# Patient Record
Sex: FEMALE | Race: BLACK OR AFRICAN AMERICAN | NOT HISPANIC OR LATINO | Employment: OTHER | RURAL
[De-identification: names, ages, dates, MRNs, and addresses within clinical notes are randomized per-mention and may not be internally consistent; named-entity substitution may affect disease eponyms.]

---

## 2022-01-21 ENCOUNTER — HOSPITAL ENCOUNTER (OUTPATIENT)
Dept: RADIOLOGY | Facility: HOSPITAL | Age: 72
Discharge: HOME | End: 2022-01-21
Attending: INTERNAL MEDICINE
Payer: MEDICARE

## 2022-01-21 VITALS — BODY MASS INDEX: 31.02 KG/M2 | HEIGHT: 66 IN | WEIGHT: 193 LBS

## 2022-01-21 DIAGNOSIS — Z12.31 VISIT FOR SCREENING MAMMOGRAM: ICD-10-CM

## 2022-01-21 PROCEDURE — 77067 SCR MAMMO BI INCL CAD: CPT | Mod: TC

## 2022-05-19 ENCOUNTER — TELEPHONE (OUTPATIENT)
Dept: GASTROENTEROLOGY | Facility: CLINIC | Age: 72
End: 2022-05-19
Payer: MEDICARE

## 2022-05-19 DIAGNOSIS — Z12.11 ENCOUNTER FOR SCREENING COLONOSCOPY: Primary | ICD-10-CM

## 2024-01-19 ENCOUNTER — HOSPITAL ENCOUNTER (OUTPATIENT)
Dept: RADIOLOGY | Facility: HOSPITAL | Age: 74
Discharge: HOME | End: 2024-01-19
Attending: INTERNAL MEDICINE
Payer: MEDICARE

## 2024-01-19 DIAGNOSIS — Z13.820 SCREENING FOR OSTEOPOROSIS: ICD-10-CM

## 2024-01-19 PROCEDURE — 77080 DXA BONE DENSITY AXIAL: CPT | Mod: TC

## 2024-01-19 PROCEDURE — 77080 DXA BONE DENSITY AXIAL: CPT | Mod: 26,,, | Performed by: RADIOLOGY

## 2024-07-03 ENCOUNTER — HOSPITAL ENCOUNTER (OUTPATIENT)
Dept: RADIOLOGY | Facility: HOSPITAL | Age: 74
Discharge: HOME | End: 2024-07-03
Attending: NURSE PRACTITIONER
Payer: MEDICARE

## 2024-07-03 VITALS — BODY MASS INDEX: 36.44 KG/M2 | HEIGHT: 62 IN | WEIGHT: 198 LBS

## 2024-07-03 DIAGNOSIS — Z12.39 BREAST CANCER SCREENING: ICD-10-CM

## 2024-07-03 PROCEDURE — 77067 SCR MAMMO BI INCL CAD: CPT | Mod: TC

## 2024-07-26 ENCOUNTER — HOSPITAL ENCOUNTER (OUTPATIENT)
Dept: RADIOLOGY | Facility: HOSPITAL | Age: 74
Discharge: HOME | End: 2024-07-26
Attending: NURSE PRACTITIONER
Payer: MEDICARE

## 2024-07-26 DIAGNOSIS — M54.16 LUMBAR RADICULOPATHY: ICD-10-CM

## 2024-07-26 PROCEDURE — 72131 CT LUMBAR SPINE W/O DYE: CPT | Mod: TC

## 2024-07-26 PROCEDURE — 72131 CT LUMBAR SPINE W/O DYE: CPT | Mod: 26,,, | Performed by: RADIOLOGY

## 2024-09-20 DIAGNOSIS — M54.16 LUMBAR RADICULOPATHY: Primary | ICD-10-CM

## 2024-10-16 ENCOUNTER — OFFICE VISIT (OUTPATIENT)
Dept: PAIN MEDICINE | Facility: CLINIC | Age: 74
End: 2024-10-16
Payer: MEDICARE

## 2024-10-16 VITALS
BODY MASS INDEX: 35.15 KG/M2 | DIASTOLIC BLOOD PRESSURE: 90 MMHG | WEIGHT: 191 LBS | RESPIRATION RATE: 18 BRPM | HEART RATE: 71 BPM | HEIGHT: 62 IN | SYSTOLIC BLOOD PRESSURE: 144 MMHG

## 2024-10-16 DIAGNOSIS — M54.16 LUMBAR RADICULOPATHY: Chronic | ICD-10-CM

## 2024-10-16 DIAGNOSIS — Z79.899 ENCOUNTER FOR LONG-TERM (CURRENT) USE OF MEDICATIONS: Primary | ICD-10-CM

## 2024-10-16 DIAGNOSIS — M54.50 CHRONIC MIDLINE LOW BACK PAIN, UNSPECIFIED WHETHER SCIATICA PRESENT: Chronic | ICD-10-CM

## 2024-10-16 DIAGNOSIS — M46.1 BILATERAL SACROILIITIS: Chronic | ICD-10-CM

## 2024-10-16 DIAGNOSIS — G89.29 CHRONIC MIDLINE LOW BACK PAIN, UNSPECIFIED WHETHER SCIATICA PRESENT: Chronic | ICD-10-CM

## 2024-10-16 LAB

## 2024-10-16 PROCEDURE — 3077F SYST BP >= 140 MM HG: CPT | Mod: ,,, | Performed by: PAIN MEDICINE

## 2024-10-16 PROCEDURE — 99203 OFFICE O/P NEW LOW 30 MIN: CPT | Mod: S$PBB,,, | Performed by: PAIN MEDICINE

## 2024-10-16 PROCEDURE — 99215 OFFICE O/P EST HI 40 MIN: CPT | Mod: PBBFAC | Performed by: PAIN MEDICINE

## 2024-10-16 PROCEDURE — 3008F BODY MASS INDEX DOCD: CPT | Mod: ,,, | Performed by: PAIN MEDICINE

## 2024-10-16 PROCEDURE — 1101F PT FALLS ASSESS-DOCD LE1/YR: CPT | Mod: ,,, | Performed by: PAIN MEDICINE

## 2024-10-16 PROCEDURE — 3080F DIAST BP >= 90 MM HG: CPT | Mod: ,,, | Performed by: PAIN MEDICINE

## 2024-10-16 PROCEDURE — 99999 PR PBB SHADOW E&M-EST. PATIENT-LVL V: CPT | Mod: PBBFAC,,, | Performed by: PAIN MEDICINE

## 2024-10-16 PROCEDURE — 99999PBSHW POCT URINE DRUG SCREEN PRESUMP: Mod: PBBFAC,,,

## 2024-10-16 PROCEDURE — 1125F AMNT PAIN NOTED PAIN PRSNT: CPT | Mod: ,,, | Performed by: PAIN MEDICINE

## 2024-10-16 PROCEDURE — 1159F MED LIST DOCD IN RCRD: CPT | Mod: ,,, | Performed by: PAIN MEDICINE

## 2024-10-16 PROCEDURE — 80305 DRUG TEST PRSMV DIR OPT OBS: CPT | Mod: PBBFAC | Performed by: PAIN MEDICINE

## 2024-10-16 PROCEDURE — 3288F FALL RISK ASSESSMENT DOCD: CPT | Mod: ,,, | Performed by: PAIN MEDICINE

## 2024-10-16 RX ORDER — HYDROCHLOROTHIAZIDE 25 MG/1
25 TABLET ORAL DAILY
COMMUNITY

## 2024-10-16 RX ORDER — LANOLIN ALCOHOL/MO/W.PET/CERES
1 CREAM (GRAM) TOPICAL DAILY
COMMUNITY

## 2024-10-16 RX ORDER — AMLODIPINE BESYLATE 10 MG/1
10 TABLET ORAL DAILY
COMMUNITY
Start: 2024-08-22

## 2024-10-16 NOTE — H&P (VIEW-ONLY)
"Chronic Pain - New Consult    Referring Physician: Roseanna Kimball NP       SUBJECTIVE: Disclaimer: This note has been generated using voice-recognition software. There may be typographical errors that have been missed during proof-reading      Initial encounter:    Regina Murray presents to the clinic for the evaluation of lowerback  pain.       74-year-old female presents for new patient evaluation and consultation from Roseanna Kimball NP.  Patient has a long history of lower back pain,  status post laminectomy greater than 12 years ago.  She did well until the past year and  the pain has progressively worsened.  The pain is primarily axial lower back without leg involvement.  The pain is exacerbated with lumbar extension and prolonged standing.  She notes some relief with flexion.  She denies paresthesia or weakness of the lower extremities.  CT of the lumbar spine from July 26, 2024 revealed multilevel facet degenerative changes, diffuse disc bulges  and status post laminectomy at L4-5.  She has been treated conservatively with tramadol.      Pain Assessment  Pain Assessment: 0-10  Pain Score:   9  Pain Location: Back  Pain Descriptors: Throbbing  Pain Frequency: Intermittent  Pain Onset: Awakened from sleep  Aggravating Factors: Standing, Bending, Walking  Pain Intervention(s): Home medication, Medication (See eMAR)      Physical Therapy/Home Exercise: no,      Pain Medications:  has a current medication list which includes the following prescription(s): amlodipine, ferrous sulfate, and hydrochlorothiazide.      Tried in Past:  NSAIDS-yes  TCA-no  SNRI-no  Anti-convulsants-no  Muscle Relaxants-no  Opioids-yes  Benzodiazepines-no     4A"s of Opioid Risk Assessment  Activity: Patient has difficulty performing  ADL  Analgesia:  Patient's pain is partially controlled by current medication.   Aberrant Behavior:  reviewed with no aberrant drug seeking/taking behavior     report:  Reviewed and consistent with " medication use as prescribed.    Patient denies suicidal or homicidal ideations    Pain interventional therapy-no    Chiropractor -no  Acupuncture - no  TENS unit -no  Massage therapy-no  Spinal decompression -no  Joint replacement -yes       Review of Systems   Constitutional: Negative.    HENT: Negative.     Eyes: Negative.    Respiratory: Negative.     Cardiovascular: Negative.    Gastrointestinal: Negative.    Endocrine: Negative.    Genitourinary: Negative.    Musculoskeletal:  Positive for arthralgias and back pain.   Integumentary:  Negative.   Neurological: Negative.    Hematological: Negative.    Psychiatric/Behavioral: Negative.               CT Lumbar Spine Without Contrast  Narrative: EXAMINATION:  CT LUMBAR SPINE WITHOUT CONTRAST    CLINICAL HISTORY:  Radiculopathy, lumbar region    TECHNIQUE:  Low-dose axial, sagittal and coronal reformations are obtained through the lumbar spine.  Contrast was not administered.  The CT examination was performed using one or more of the following dose reduction techniques: Automated exposure control, adjustment of the mA and kV according to patient's size, use of acute or iterative reconstruction techniques.    COMPARISON:  None.    FINDINGS:  Prominent degenerative changes are seen at T11-12 partially assessed with osteophyte formation and disc height loss and vacuum disc phenomena.  There is disc bulging with bilateral foraminal stenosis and mild spinal canal stenosis at this level.  The vertebral body heights of the lumbar spine are maintained.  There is grade 1 anterolisthesis of L4 on L5.  At the L4-5 level there has been posterior fusion changes.  This hardware appears in the expected location.  No acute fracture identified.  Degenerative endplate and facet changes are seen throughout the lower thoracic and lumbar spine.    L1-2: Disc bulge and facet degeneration.  There appears to be mild spinal canal and bilateral foraminal stenosis.    L2-3: Diffuse disc  "bulging and facet degeneration.  Mild to moderate spinal canal stenosis and bilateral foraminal stenosis.    L3-4: Disc bulging and facet degeneration.  At least mild spinal canal stenosis and mild-to-moderate bilateral foraminal stenosis.    L4-5: There is been partial laminectomy changes with what appear to be some osseous overgrowth.  No spinal canal stenosis appreciated.  Osteophytes with mild bilateral foraminal stenosis.    L5-S1: Disc bulge and facet degeneration.  Mild spinal canal and moderate bilateral foraminal stenosis.  Impression: Postoperative changes of L4-5.  Multilevel degenerative changes throughout the lower thoracic and lumbar spine.    Electronically signed by: Tim Terry  Date:    07/26/2024  Time:    16:32         History reviewed. No pertinent past medical history.  Past Surgical History:   Procedure Laterality Date    HYSTERECTOMY      KNEE SURGERY Left      Social History     Socioeconomic History    Marital status:    Tobacco Use    Smoking status: Never    Smokeless tobacco: Never     No family history on file.  Review of patient's allergies indicates:   Allergen Reactions    Penicillins Itching         OBJECTIVE:  Vitals:    10/16/24 1425   BP: (!) 144/90   Pulse: 71   Resp: 18     BP (!) 144/90 (BP Location: Right arm, Patient Position: Sitting)   Pulse 71   Resp 18   Ht 5' 2" (1.575 m)   Wt 86.6 kg (191 lb)   BMI 34.93 kg/m²   Physical Exam  Vitals and nursing note reviewed.   Constitutional:       General: She is not in acute distress.     Appearance: Normal appearance. She is not ill-appearing, toxic-appearing or diaphoretic.   HENT:      Head: Normocephalic and atraumatic.      Nose: Nose normal.      Mouth/Throat:      Mouth: Mucous membranes are moist.   Eyes:      Extraocular Movements: Extraocular movements intact.      Pupils: Pupils are equal, round, and reactive to light.   Cardiovascular:      Rate and Rhythm: Normal rate and regular rhythm.      Heart sounds: " Normal heart sounds.   Pulmonary:      Effort: Pulmonary effort is normal. No respiratory distress.      Breath sounds: Normal breath sounds. No stridor. No wheezing or rhonchi.   Abdominal:      General: Bowel sounds are normal.      Palpations: Abdomen is soft.   Musculoskeletal:         General: No swelling or deformity.      Cervical back: Normal and normal range of motion. No spasms or tenderness. No pain with movement. Normal range of motion.      Thoracic back: Normal.      Lumbar back: Tenderness and bony tenderness present. No spasms. Decreased range of motion. Negative right straight leg raise test and negative left straight leg raise test. No scoliosis.      Right lower leg: No edema.      Left lower leg: No edema.      Comments: Lumbar pain with flexion, extension and lateral rotation  SI Joint Exam:     Palpation of the bilateral  SI joint is painful.  CHERRIE/ Timi's test is positive bilaterally  Gaenslen/Yoeman's test is positive bilaterally  Thigh thrust test is positive bilaterally     Skin:     General: Skin is warm.   Neurological:      General: No focal deficit present.      Mental Status: She is alert and oriented to person, place, and time. Mental status is at baseline.      Cranial Nerves: No cranial nerve deficit.      Sensory: Sensation is intact. No sensory deficit.      Motor: No weakness.      Coordination: Coordination normal.      Gait: Gait abnormal.      Deep Tendon Reflexes:      Reflex Scores:       Patellar reflexes are 1+ on the right side and 1+ on the left side.       Achilles reflexes are 1+ on the right side and 1+ on the left side.     Comments: Mild leg length discrepancy with right leg slightly longer than left   Psychiatric:         Mood and Affect: Mood normal.         Behavior: Behavior normal.          ASSESSMENT: 74 y.o. year old female with pain, consistent with     Encounter Diagnoses   Name Primary?    Lumbar radiculopathy     Encounter for long-term (current) use  of medications Yes    Bilateral sacroiliitis     Chronic midline low back pain, unspecified whether sciatica present         PLAN:   1. reviewed  2..Addiction, Dependency, Tolerance, Opioid abuse-misuse, Death, Diversion Discussed. Overdose reversal drug Naloxone discussed  3. Opioid contract signed today  4.Refill/ Continue medications for pain control and function       Requested Prescriptions      No prescriptions requested or ordered in this encounter     5. Urine drug screen and confirmation testing was ordered as documented on the requisition form in order to monitor for compliance with prescribed opiates and to ensure that there was no misuse/abuse of other non-prescribed opiates or illicit drugs.  I will determine if the patient is suitable for continuation of opioid therapy after obtaining  the definitive urine drug screen for confirmation.  6. Start physical therapy 2 to 3 times week x6 weeks for lower back pain and sacroiliitis  7. Schedule bilateral SI joint injections under fluoroscopy for bilateral sacroiliitis  Orders Placed This Encounter   Procedures    Controlled Substance Monitoring Panel, Random, Urine     Standing Status:   Future     Number of Occurrences:   1     Standing Expiration Date:   12/15/2025    Ambulatory referral/consult to Physical/Occupational Therapy     Standing Status:   Future     Standing Expiration Date:   11/16/2025     Referral Priority:   Routine     Referral Type:   Physical Medicine     Referral Reason:   Specialty Services Required     Requested Specialty:   Physical Therapy     Number of Visits Requested:   1    POCT Urine Drug Screen ([facility])     Interpretive Information:     Negative:  No drug detected at the cut off level.   Positive:  This result represents presumptive positive for the   tested drug, other substances may yield a positive response other   than the analyte of interest. This result should be utilized for   diagnostic purpose only.  Confirmation testing will be performed upon physician request only.       Case Request Operating Room: BLOCK, SACROILIAC JOINT     Order Specific Question:   Medical Necessity:     Answer:   Medically Non-Urgent [100]     Order Specific Question:   Case classification     Answer:   E - Elective [90]     Order Specific Question:   Positioning:     Answer:   Prone [1003]     Order Specific Question:   Post-Procedure Disposition:     Answer:   PACU [1]     Order Specific Question:   Estimated Length of Stay:     Answer:   0 midnight     Order Specific Question:   Is an on-site pathologist required for this procedure?     Answer:   N/A      8.Indications for this procedure for this specific patient include the following     - Pain is in upper level of iliac crest and gluteal fold ( Spine and pelvis) for greater than 3 months  - Repeat injections are done with 75% relief from diagnostic injections and not done more frequently than every 2 months with a max of 6 in a year  - Patient  failed conservative therapies and injection is being provided as part of a comprehensive pain program  - Exam is negative of any neurological findings or radiculopathy and consistent with SI joint pain  -Patient has nonradicular pain and greatest below the L5 vertebrae, localized over the posterior SI joint  - Exam positive for compression test (Marc's test) , Timi's test, Gaenslen and compression thrust test  -Pain severity causes functional deficits and patient is unable to perform ADLs despite conservative treatments  - Patient  is participating in an active ongoing rehabilitation program, ongoing home exercise program, or functional restoration program including ice, heat and rest  - For positive diagnostic or therapeutic blocks to provide greater than 50% consistent pain relief in an individual's level of back pain, increased function and decrease use of pain meds     9.Monitored Anesthesia Care medical necessity authorization  request:    Monitor anesthesia request is medically indicated for the scheduled nerve block procedure due to:  - needle phobia and anxiety, placing  the patient at risk during the provided service.  -patient has an ASA class greater than 3 and requires constant presence of an anesthesiologist during the procedure:  -patient has severe problems with muscles and muscle spasticity that makes it hard to lie still  -patient suffers from chronic pain and is unable to function due to  diminished ADLs    10.The planned medically necessary  surgical procedure is performed in a hospital outpatient department and not in an ambulatory surgical center due to:     -there is no geographically assessable ambulatory surgery center that has the  necessary equipment and fluoroscopy needed for the procedure     -there is no geographically assessable ambulatory surgical center available at which the physician has privileges     -an ASC's  specific  guideline regarding the individuals weight or health conditions that prevent the use of an ASC       -injections must be performed under fluoroscopy image guidance with contrast unless the patient has a documented contrast allergy or pregnancy       The total time spent for evaluation and management on 10/16/2024 including reviewing separately obtained history, performing a medically appropriate exam and evaluation, documenting clinical information in the health record, independently interpreting results and communicating them to the patient/family/caregiver, and ordering medications/tests/procedures was between 15-29 minutes.    The above plan and management options were discussed at length with patient. Patient is in agreement with the above and verbalized understanding. It will be communicated with the referring physician via electronic record, fax, or mail.    Leela Ruth  10/16/2024

## 2024-10-16 NOTE — PROGRESS NOTES
"Chronic Pain - New Consult    Referring Physician: Roseanna Kimball NP       SUBJECTIVE: Disclaimer: This note has been generated using voice-recognition software. There may be typographical errors that have been missed during proof-reading      Initial encounter:    Regina Murray presents to the clinic for the evaluation of lowerback  pain.       74-year-old female presents for new patient evaluation and consultation from Roseanna Kimball NP.  Patient has a long history of lower back pain,  status post laminectomy greater than 12 years ago.  She did well until the past year and  the pain has progressively worsened.  The pain is primarily axial lower back without leg involvement.  The pain is exacerbated with lumbar extension and prolonged standing.  She notes some relief with flexion.  She denies paresthesia or weakness of the lower extremities.  CT of the lumbar spine from July 26, 2024 revealed multilevel facet degenerative changes, diffuse disc bulges  and status post laminectomy at L4-5.  She has been treated conservatively with tramadol.      Pain Assessment  Pain Assessment: 0-10  Pain Score:   9  Pain Location: Back  Pain Descriptors: Throbbing  Pain Frequency: Intermittent  Pain Onset: Awakened from sleep  Aggravating Factors: Standing, Bending, Walking  Pain Intervention(s): Home medication, Medication (See eMAR)      Physical Therapy/Home Exercise: no,      Pain Medications:  has a current medication list which includes the following prescription(s): amlodipine, ferrous sulfate, and hydrochlorothiazide.      Tried in Past:  NSAIDS-yes  TCA-no  SNRI-no  Anti-convulsants-no  Muscle Relaxants-no  Opioids-yes  Benzodiazepines-no     4A"s of Opioid Risk Assessment  Activity: Patient has difficulty performing  ADL  Analgesia:  Patient's pain is partially controlled by current medication.   Aberrant Behavior:  reviewed with no aberrant drug seeking/taking behavior     report:  Reviewed and consistent with " medication use as prescribed.    Patient denies suicidal or homicidal ideations    Pain interventional therapy-no    Chiropractor -no  Acupuncture - no  TENS unit -no  Massage therapy-no  Spinal decompression -no  Joint replacement -yes       Review of Systems   Constitutional: Negative.    HENT: Negative.     Eyes: Negative.    Respiratory: Negative.     Cardiovascular: Negative.    Gastrointestinal: Negative.    Endocrine: Negative.    Genitourinary: Negative.    Musculoskeletal:  Positive for arthralgias and back pain.   Integumentary:  Negative.   Neurological: Negative.    Hematological: Negative.    Psychiatric/Behavioral: Negative.               CT Lumbar Spine Without Contrast  Narrative: EXAMINATION:  CT LUMBAR SPINE WITHOUT CONTRAST    CLINICAL HISTORY:  Radiculopathy, lumbar region    TECHNIQUE:  Low-dose axial, sagittal and coronal reformations are obtained through the lumbar spine.  Contrast was not administered.  The CT examination was performed using one or more of the following dose reduction techniques: Automated exposure control, adjustment of the mA and kV according to patient's size, use of acute or iterative reconstruction techniques.    COMPARISON:  None.    FINDINGS:  Prominent degenerative changes are seen at T11-12 partially assessed with osteophyte formation and disc height loss and vacuum disc phenomena.  There is disc bulging with bilateral foraminal stenosis and mild spinal canal stenosis at this level.  The vertebral body heights of the lumbar spine are maintained.  There is grade 1 anterolisthesis of L4 on L5.  At the L4-5 level there has been posterior fusion changes.  This hardware appears in the expected location.  No acute fracture identified.  Degenerative endplate and facet changes are seen throughout the lower thoracic and lumbar spine.    L1-2: Disc bulge and facet degeneration.  There appears to be mild spinal canal and bilateral foraminal stenosis.    L2-3: Diffuse disc  "bulging and facet degeneration.  Mild to moderate spinal canal stenosis and bilateral foraminal stenosis.    L3-4: Disc bulging and facet degeneration.  At least mild spinal canal stenosis and mild-to-moderate bilateral foraminal stenosis.    L4-5: There is been partial laminectomy changes with what appear to be some osseous overgrowth.  No spinal canal stenosis appreciated.  Osteophytes with mild bilateral foraminal stenosis.    L5-S1: Disc bulge and facet degeneration.  Mild spinal canal and moderate bilateral foraminal stenosis.  Impression: Postoperative changes of L4-5.  Multilevel degenerative changes throughout the lower thoracic and lumbar spine.    Electronically signed by: Tim Terry  Date:    07/26/2024  Time:    16:32         History reviewed. No pertinent past medical history.  Past Surgical History:   Procedure Laterality Date    HYSTERECTOMY      KNEE SURGERY Left      Social History     Socioeconomic History    Marital status:    Tobacco Use    Smoking status: Never    Smokeless tobacco: Never     No family history on file.  Review of patient's allergies indicates:   Allergen Reactions    Penicillins Itching         OBJECTIVE:  Vitals:    10/16/24 1425   BP: (!) 144/90   Pulse: 71   Resp: 18     BP (!) 144/90 (BP Location: Right arm, Patient Position: Sitting)   Pulse 71   Resp 18   Ht 5' 2" (1.575 m)   Wt 86.6 kg (191 lb)   BMI 34.93 kg/m²   Physical Exam  Vitals and nursing note reviewed.   Constitutional:       General: She is not in acute distress.     Appearance: Normal appearance. She is not ill-appearing, toxic-appearing or diaphoretic.   HENT:      Head: Normocephalic and atraumatic.      Nose: Nose normal.      Mouth/Throat:      Mouth: Mucous membranes are moist.   Eyes:      Extraocular Movements: Extraocular movements intact.      Pupils: Pupils are equal, round, and reactive to light.   Cardiovascular:      Rate and Rhythm: Normal rate and regular rhythm.      Heart sounds: " Normal heart sounds.   Pulmonary:      Effort: Pulmonary effort is normal. No respiratory distress.      Breath sounds: Normal breath sounds. No stridor. No wheezing or rhonchi.   Abdominal:      General: Bowel sounds are normal.      Palpations: Abdomen is soft.   Musculoskeletal:         General: No swelling or deformity.      Cervical back: Normal and normal range of motion. No spasms or tenderness. No pain with movement. Normal range of motion.      Thoracic back: Normal.      Lumbar back: Tenderness and bony tenderness present. No spasms. Decreased range of motion. Negative right straight leg raise test and negative left straight leg raise test. No scoliosis.      Right lower leg: No edema.      Left lower leg: No edema.      Comments: Lumbar pain with flexion, extension and lateral rotation  SI Joint Exam:     Palpation of the bilateral  SI joint is painful.  CHERRIE/ Timi's test is positive bilaterally  Gaenslen/Yoeman's test is positive bilaterally  Thigh thrust test is positive bilaterally     Skin:     General: Skin is warm.   Neurological:      General: No focal deficit present.      Mental Status: She is alert and oriented to person, place, and time. Mental status is at baseline.      Cranial Nerves: No cranial nerve deficit.      Sensory: Sensation is intact. No sensory deficit.      Motor: No weakness.      Coordination: Coordination normal.      Gait: Gait abnormal.      Deep Tendon Reflexes:      Reflex Scores:       Patellar reflexes are 1+ on the right side and 1+ on the left side.       Achilles reflexes are 1+ on the right side and 1+ on the left side.     Comments: Mild leg length discrepancy with right leg slightly longer than left   Psychiatric:         Mood and Affect: Mood normal.         Behavior: Behavior normal.          ASSESSMENT: 74 y.o. year old female with pain, consistent with     Encounter Diagnoses   Name Primary?    Lumbar radiculopathy     Encounter for long-term (current) use  of medications Yes    Bilateral sacroiliitis     Chronic midline low back pain, unspecified whether sciatica present         PLAN:   1. reviewed  2..Addiction, Dependency, Tolerance, Opioid abuse-misuse, Death, Diversion Discussed. Overdose reversal drug Naloxone discussed  3. Opioid contract signed today  4.Refill/ Continue medications for pain control and function       Requested Prescriptions      No prescriptions requested or ordered in this encounter     5. Urine drug screen and confirmation testing was ordered as documented on the requisition form in order to monitor for compliance with prescribed opiates and to ensure that there was no misuse/abuse of other non-prescribed opiates or illicit drugs.  I will determine if the patient is suitable for continuation of opioid therapy after obtaining  the definitive urine drug screen for confirmation.  6. Start physical therapy 2 to 3 times week x6 weeks for lower back pain and sacroiliitis  7. Schedule bilateral SI joint injections under fluoroscopy for bilateral sacroiliitis  Orders Placed This Encounter   Procedures    Controlled Substance Monitoring Panel, Random, Urine     Standing Status:   Future     Number of Occurrences:   1     Standing Expiration Date:   12/15/2025    Ambulatory referral/consult to Physical/Occupational Therapy     Standing Status:   Future     Standing Expiration Date:   11/16/2025     Referral Priority:   Routine     Referral Type:   Physical Medicine     Referral Reason:   Specialty Services Required     Requested Specialty:   Physical Therapy     Number of Visits Requested:   1    POCT Urine Drug Screen ([facility])     Interpretive Information:     Negative:  No drug detected at the cut off level.   Positive:  This result represents presumptive positive for the   tested drug, other substances may yield a positive response other   than the analyte of interest. This result should be utilized for   diagnostic purpose only.  Confirmation testing will be performed upon physician request only.       Case Request Operating Room: BLOCK, SACROILIAC JOINT     Order Specific Question:   Medical Necessity:     Answer:   Medically Non-Urgent [100]     Order Specific Question:   Case classification     Answer:   E - Elective [90]     Order Specific Question:   Positioning:     Answer:   Prone [1003]     Order Specific Question:   Post-Procedure Disposition:     Answer:   PACU [1]     Order Specific Question:   Estimated Length of Stay:     Answer:   0 midnight     Order Specific Question:   Is an on-site pathologist required for this procedure?     Answer:   N/A      8.Indications for this procedure for this specific patient include the following     - Pain is in upper level of iliac crest and gluteal fold ( Spine and pelvis) for greater than 3 months  - Repeat injections are done with 75% relief from diagnostic injections and not done more frequently than every 2 months with a max of 6 in a year  - Patient  failed conservative therapies and injection is being provided as part of a comprehensive pain program  - Exam is negative of any neurological findings or radiculopathy and consistent with SI joint pain  -Patient has nonradicular pain and greatest below the L5 vertebrae, localized over the posterior SI joint  - Exam positive for compression test (Marc's test) , Timi's test, Gaenslen and compression thrust test  -Pain severity causes functional deficits and patient is unable to perform ADLs despite conservative treatments  - Patient  is participating in an active ongoing rehabilitation program, ongoing home exercise program, or functional restoration program including ice, heat and rest  - For positive diagnostic or therapeutic blocks to provide greater than 50% consistent pain relief in an individual's level of back pain, increased function and decrease use of pain meds     9.Monitored Anesthesia Care medical necessity authorization  request:    Monitor anesthesia request is medically indicated for the scheduled nerve block procedure due to:  - needle phobia and anxiety, placing  the patient at risk during the provided service.  -patient has an ASA class greater than 3 and requires constant presence of an anesthesiologist during the procedure:  -patient has severe problems with muscles and muscle spasticity that makes it hard to lie still  -patient suffers from chronic pain and is unable to function due to  diminished ADLs    10.The planned medically necessary  surgical procedure is performed in a hospital outpatient department and not in an ambulatory surgical center due to:     -there is no geographically assessable ambulatory surgery center that has the  necessary equipment and fluoroscopy needed for the procedure     -there is no geographically assessable ambulatory surgical center available at which the physician has privileges     -an ASC's  specific  guideline regarding the individuals weight or health conditions that prevent the use of an ASC       -injections must be performed under fluoroscopy image guidance with contrast unless the patient has a documented contrast allergy or pregnancy       The total time spent for evaluation and management on 10/16/2024 including reviewing separately obtained history, performing a medically appropriate exam and evaluation, documenting clinical information in the health record, independently interpreting results and communicating them to the patient/family/caregiver, and ordering medications/tests/procedures was between 15-29 minutes.    The above plan and management options were discussed at length with patient. Patient is in agreement with the above and verbalized understanding. It will be communicated with the referring physician via electronic record, fax, or mail.    Leela Ruth  10/16/2024

## 2024-10-16 NOTE — PATIENT INSTRUCTIONS
YOU ARE SCHEDULED ON 11/12/24 AT 9:00 AM FOR YOUR PROCEDURE- BILATERAL SI JOINT INJECTION WITH  .        Procedure Instructions:    Nothing to eat or drink for 8 hours or after midnight including gum, candy, mints, or tobacco products.  If you are scheduled for 1:30 or later nothing to eat or drink after 5 a.m. the morning of the procedure, including gum, candy, mints, or tobacco products.  Must have a  at least 18 yrs of age to stay present at all times  No Diabetic medications the morning of procedure, check blood sugar the morning of procedure, if it is greater than 200 call the office at [phone]  If you are started on antibiotics or have been prescribed antibiotics, have a fever, or have any other type of infection call to reschedule procedure.  If you take blood pressure medications you can take it at your regular scheduled time with a small sip of WATER!  Dentures are to be removed prior to procedure or we can provide you with a denture cup to remove them prior to being taken back for procedure.   False eyelashes are to be removed before the morning of procedure.    Contacts will have to be removed prior to procedure.  No jewelry is to be worn to the procedure.     HOLD ASPIRIN AND ASPIRIN PRODUCTS  (ASPIRIN, BC POWDER ETC. ) FOR 7 DAYS  PRIOR TO PROCEDURE  HOLD NSAIDS( ibuprofen, mobic, meloxicam, advil, diclofenac, naproxen, relafen, celebrex,  methotrexate, aleve etc....)  FOR 3 DAYS   PRIOR TO PROCEDURE        SIGNATURE:_________________________________________________________________________________________________

## 2024-11-12 ENCOUNTER — HOSPITAL ENCOUNTER (OUTPATIENT)
Facility: HOSPITAL | Age: 74
Discharge: HOME | End: 2024-11-12
Attending: PAIN MEDICINE | Admitting: PAIN MEDICINE
Payer: MEDICARE

## 2024-11-12 ENCOUNTER — ANESTHESIA EVENT (OUTPATIENT)
Dept: PAIN MEDICINE | Facility: HOSPITAL | Age: 74
End: 2024-11-12
Payer: MEDICARE

## 2024-11-12 ENCOUNTER — ANESTHESIA (OUTPATIENT)
Dept: PAIN MEDICINE | Facility: HOSPITAL | Age: 74
End: 2024-11-12
Payer: MEDICARE

## 2024-11-12 VITALS
HEART RATE: 52 BPM | BODY MASS INDEX: 35.33 KG/M2 | DIASTOLIC BLOOD PRESSURE: 75 MMHG | WEIGHT: 192 LBS | RESPIRATION RATE: 13 BRPM | HEIGHT: 62 IN | TEMPERATURE: 98 F | SYSTOLIC BLOOD PRESSURE: 174 MMHG | OXYGEN SATURATION: 97 %

## 2024-11-12 DIAGNOSIS — M46.1 SACROILIITIS: ICD-10-CM

## 2024-11-12 PROCEDURE — 63600175 PHARM REV CODE 636 W HCPCS: Mod: JG | Performed by: PAIN MEDICINE

## 2024-11-12 PROCEDURE — 27096 INJECT SACROILIAC JOINT: CPT | Mod: 50 | Performed by: PAIN MEDICINE

## 2024-11-12 PROCEDURE — 37000008 HC ANESTHESIA 1ST 15 MINUTES: Performed by: PAIN MEDICINE

## 2024-11-12 PROCEDURE — D9220A PRA ANESTHESIA: Mod: ,,, | Performed by: NURSE ANESTHETIST, CERTIFIED REGISTERED

## 2024-11-12 PROCEDURE — 27096 INJECT SACROILIAC JOINT: CPT | Mod: 50,,, | Performed by: PAIN MEDICINE

## 2024-11-12 PROCEDURE — 63600175 PHARM REV CODE 636 W HCPCS: Performed by: NURSE ANESTHETIST, CERTIFIED REGISTERED

## 2024-11-12 RX ORDER — LIDOCAINE HYDROCHLORIDE 20 MG/ML
INJECTION, SOLUTION EPIDURAL; INFILTRATION; INTRACAUDAL; PERINEURAL
Status: DISCONTINUED | OUTPATIENT
Start: 2024-11-12 | End: 2024-11-12

## 2024-11-12 RX ORDER — TRIAMCINOLONE ACETONIDE 40 MG/ML
INJECTION, SUSPENSION INTRA-ARTICULAR; INTRAMUSCULAR CODE/TRAUMA/SEDATION MEDICATION
Status: DISCONTINUED | OUTPATIENT
Start: 2024-11-12 | End: 2024-11-12 | Stop reason: HOSPADM

## 2024-11-12 RX ORDER — BUPIVACAINE HYDROCHLORIDE 2.5 MG/ML
INJECTION, SOLUTION INFILTRATION; PERINEURAL CODE/TRAUMA/SEDATION MEDICATION
Status: DISCONTINUED | OUTPATIENT
Start: 2024-11-12 | End: 2024-11-12 | Stop reason: HOSPADM

## 2024-11-12 RX ORDER — SODIUM CHLORIDE 9 MG/ML
INJECTION, SOLUTION INTRAVENOUS CONTINUOUS
Status: DISCONTINUED | OUTPATIENT
Start: 2024-11-12 | End: 2024-11-12 | Stop reason: HOSPADM

## 2024-11-12 RX ORDER — PROPOFOL 10 MG/ML
VIAL (ML) INTRAVENOUS
Status: DISCONTINUED | OUTPATIENT
Start: 2024-11-12 | End: 2024-11-12

## 2024-11-12 RX ADMIN — PROPOFOL 100 MG: 10 INJECTION, EMULSION INTRAVENOUS at 11:11

## 2024-11-12 RX ADMIN — LIDOCAINE HYDROCHLORIDE 80 MG: 20 INJECTION, SOLUTION EPIDURAL; INFILTRATION; INTRACAUDAL; PERINEURAL at 11:11

## 2024-11-12 NOTE — ANESTHESIA PREPROCEDURE EVALUATION
11/12/2024  Regina Murray is a 74 y.o., female.      Pre-op Assessment    I have reviewed the Patient Summary Reports.     I have reviewed the Nursing Notes. I have reviewed the NPO Status.   I have reviewed the Medications.     Review of Systems  Anesthesia Hx:  No problems with previous Anesthesia   History of prior surgery of interest to airway management or planning:          Denies Family Hx of Anesthesia complications.    Denies Personal Hx of Anesthesia complications.                      Active Ambulatory Problems     Diagnosis Date Noted    No Active Ambulatory Problems     Resolved Ambulatory Problems     Diagnosis Date Noted    No Resolved Ambulatory Problems     No Additional Past Medical History      Review of patient's allergies indicates:   Allergen Reactions    Penicillins Itching      No current facility-administered medications on file prior to encounter.     Current Outpatient Medications on File Prior to Encounter   Medication Sig Dispense Refill    amLODIPine (NORVASC) 10 MG tablet Take 10 mg by mouth once daily.      ferrous sulfate (FEOSOL) Tab tablet Take 1 tablet by mouth once daily.      hydroCHLOROthiazide (HYDRODIURIL) 25 MG tablet Take 25 mg by mouth once daily.        Past Surgical History:   Procedure Laterality Date    HYSTERECTOMY      KNEE SURGERY Left         Physical Exam  General: Well nourished    Airway:  Mallampati: II   Mouth Opening: Normal  TM Distance: Normal, at least 6 cm  Tongue: Normal  Neck ROM: Normal ROM        Anesthesia Plan  Type of Anesthesia, risks & benefits discussed:    Anesthesia Type: MAC  Intra-op Monitoring Plan: Standard ASA Monitors  Post Op Pain Control Plan: multimodal analgesia  Induction:  IV  Informed Consent: Informed consent signed with the Patient and all parties understand the risks and agree with anesthesia plan.  All questions  answered. Patient consented to blood products? No  ASA Score: 2  Day of Surgery Review of History & Physical: H&P Update referred to the surgeon/provider.I have interviewed and examined the patient. I have reviewed the patient's H&P dated: There are no significant changes.     Ready For Surgery From Anesthesia Perspective.     .

## 2024-11-12 NOTE — OP NOTE
Procedure Note    Procedure Date: 11/12/2024    Procedure Performed: Bilateral Sacroiliac joint injection, with Fluoroscopic Guidance    Indications: Patient has failed conservative therapy.      Pre-op diagnosis: Bilateral Sacroiliitis    Post-op diagnosis: same    Physician: KEILA Ruth MD    Anesthesia: MAC    Medications injected: 0.25% Bupivacaine, Kenalog 40mg    Local anesthetic used: 1% Lidocaine, 2ml    Estimated Blood Loss: None    Complications:None    Technique:  The patient was interviewed in the holding area and Risks/Benefits were discussed, including, but not limited to, the possibility of new or different pain, bleeding or infection.   All questions were answered.  The patient understood and accepted risks.  Consent was reviewed and signed.   A time out was taken to identify the patient, procedure and side of the procedure.  The skin was prepped with chloroprep and sterile drapes were applied. The bilateral  SI joint was identified by fluoroscopy in an oblique plane. Skin and subcutaneous tissues overyling the target area was anesthetized with 1-2 mL of Lidocaine 1%.  A 22g 3 1/2 inch spinal needle was advanced under intermittent fluoroscopy until joint space was entered at the junction of the superior 2/3 & inferior 1/3 of the joint.  There was no paresthesia with needle placement. Aspiration was negative for blood. Next, a 2 cc solution from a mixture of 40 mg kenalog and 3mL of 0.25% Marcaine was injected without difficulty or resistance into each joint. The needle was removed and a sterile Band-Aid dressing was applied to the puncture site. The patient tolerated the procedure well.  The patient was monitored after the procedure.  The patient was given post procedure and discharge instructions to follow at home. The patient was discharged in a stable condition

## 2024-11-12 NOTE — PLAN OF CARE
Plan:  D/c pt via wheelchair at 1230  Informed pt if does not void in 8 hours to go to ER. Notify if redness, drainage, from injection site or fever over next 3-4 days. Rest and drink plenty of fluids for the remainder of the day. No lifting over 5 lbs. For the remainder of the day. Continue regular medications as prescribed. May take pain medications as prescribed.     Pain improved 100%

## 2024-11-12 NOTE — TRANSFER OF CARE
"Anesthesia Transfer of Care Note    Patient: Regina Murray    Procedure(s) Performed: Procedure(s) (LRB):  BLOCK, SACROILIAC JOINT (Bilateral)    Patient location: GI    Anesthesia Type: MAC    Transport from OR: Transported from OR on room air with adequate spontaneous ventilation    Post pain: adequate analgesia    Post assessment: no apparent anesthetic complications    Post vital signs: stable    Level of consciousness: sedated and responds to stimulation    Nausea/Vomiting: no nausea/vomiting    Complications: none    Transfer of care protocol was followedComments: Good SV continue, NAD noted, VSS, RTRN      Last vitals: Visit Vitals  BP (!) 146/58   Pulse (!) 51   Temp 36.6 °C (97.8 °F) (Oral)   Resp 17   Ht 5' 2" (1.575 m)   Wt 87.1 kg (192 lb)   SpO2 100%   BMI 35.12 kg/m²     "

## 2024-11-12 NOTE — BRIEF OP NOTE
The  Discharge Note  Short Stay    Admit Date: 11/12/2024    Discharge Date: 11/12/2024    Attending Physician: Leela Ruth     Discharge Provider: Leela Ruth    Diagnosis: Bilateral sacroiliitis    Procedure performed: Bilateral SI joint injection under fluoroscopy    Findings: Procedure tolerated well and without complications. Consistent with diagnosis.    EBL: 0cc    Specimens: None    Discharged Condition: Good    Final Diagnoses: Bilateral sacroiliitis [M46.1]    Disposition: Home or Self Care    Hospital Course: No complications, uneventful    Outcome of Hospitalization, Treatment, Procedure, or Surgery:  Patient was admitted for outpatient interventional pain management procedure. The patient tolerated the procedure well with no complications.    Follow up/Patient Instructions:  Follow up as scheduled in Pain Management office in 3-4 weeks.  Patient has received instructions and follow up date and time.    Medications:  Continue previous medications

## 2024-11-13 NOTE — ANESTHESIA POSTPROCEDURE EVALUATION
Anesthesia Post Evaluation    Patient: Regina Murray    Procedure(s) Performed: Procedure(s) (LRB):  BLOCK, SACROILIAC JOINT (Bilateral)    Final Anesthesia Type: general      Patient location: [facility].  Patient participation: Yes- Able to Participate  Level of consciousness: awake and alert  Post-procedure vital signs: reviewed and stable  Pain management: adequate  Airway patency: patent    PONV status at discharge: No PONV  Anesthetic complications: no      Cardiovascular status: blood pressure returned to baseline, hemodynamically stable and stable  Respiratory status: unassisted  Hydration status: euvolemic  Follow-up not needed.  Comments: Pt voices appreciation for care              Vitals Value Taken Time   /66 11/12/24 1233   Temp 36.6 °C (97.8 °F) 11/12/24 1156   Pulse 52 11/12/24 1234   Resp 11 11/12/24 1234   SpO2 97 % 11/12/24 1220   Vitals shown include unfiled device data.      Event Time   Out of Recovery 12:15:00         Pain/Solomon Score: Solomon Score: 10 (11/12/2024 12:05 PM)

## 2024-11-25 NOTE — PROGRESS NOTES
Subjective:         Patient ID: Regina Murray is a 74 y.o. female.    Chief Complaint: Back Pain      Pain  This is a chronic problem. The current episode started more than 1 year ago. The problem occurs daily. The problem has been waxing and waning. Associated symptoms include arthralgias. Pertinent negatives include no anorexia, change in bowel habit, chest pain, chills, coughing, diaphoresis, fever, neck pain, rash, sore throat, swollen glands, urinary symptoms, vertigo or vomiting.     Review of Systems   Constitutional:  Negative for activity change, appetite change, chills, diaphoresis, fever and unexpected weight change.   HENT:  Negative for drooling, ear discharge, ear pain, facial swelling, nosebleeds, sore throat, trouble swallowing, voice change and goiter.    Eyes:  Negative for photophobia, pain, discharge, redness and visual disturbance.   Respiratory:  Negative for apnea, cough, choking, chest tightness, shortness of breath, wheezing and stridor.    Cardiovascular:  Negative for chest pain, palpitations and leg swelling.   Gastrointestinal:  Negative for abdominal distention, anorexia, change in bowel habit, diarrhea, rectal pain, vomiting and fecal incontinence.   Endocrine: Negative for cold intolerance, heat intolerance, polydipsia, polyphagia and polyuria.   Genitourinary:  Negative for bladder incontinence, dysuria, flank pain, frequency and hot flashes.   Musculoskeletal:  Positive for arthralgias, back pain and leg pain. Negative for neck pain.   Integumentary:  Negative for color change, pallor and rash.   Allergic/Immunologic: Negative for immunocompromised state.   Neurological:  Negative for dizziness, vertigo, seizures, syncope, facial asymmetry, speech difficulty, light-headedness, memory loss and coordination difficulties.   Hematological:  Negative for adenopathy. Does not bruise/bleed easily.   Psychiatric/Behavioral:  Negative for agitation, behavioral problems, confusion,  "decreased concentration, dysphoric mood, hallucinations, self-injury and suicidal ideas. The patient is not nervous/anxious and is not hyperactive.            History reviewed. No pertinent past medical history.  Past Surgical History:   Procedure Laterality Date    HYSTERECTOMY      INJECTION OF ANESTHETIC AGENT INTO SACROILIAC JOINT Bilateral 11/12/2024    Procedure: BLOCK, SACROILIAC JOINT;  Surgeon: Leela Ruth MD;  Location: [facility];  Service: Pain Management;  Laterality: Bilateral;    KNEE SURGERY Left      Social History     Socioeconomic History    Marital status:    Tobacco Use    Smoking status: Never    Smokeless tobacco: Never     No family history on file.  Review of patient's allergies indicates:   Allergen Reactions    Penicillins Itching        Objective:  Vitals:    12/11/24 0838 12/11/24 0839   BP: (!) 176/79    Pulse: 80    Resp: 17    SpO2: 98%    Weight: 86.2 kg (190 lb)    Height: 5' 6" (1.676 m)    PainSc:   8   8         Physical Exam  Vitals and nursing note reviewed. Exam conducted with a chaperone present.   Constitutional:       General: She is awake. She is not in acute distress.     Appearance: Normal appearance. She is not ill-appearing, toxic-appearing or diaphoretic.   HENT:      Head: Normocephalic and atraumatic.      Nose: Nose normal.      Mouth/Throat:      Mouth: Mucous membranes are moist.      Pharynx: Oropharynx is clear.   Eyes:      Conjunctiva/sclera: Conjunctivae normal.      Pupils: Pupils are equal, round, and reactive to light.   Cardiovascular:      Rate and Rhythm: Normal rate.   Pulmonary:      Effort: Pulmonary effort is normal. No respiratory distress.   Abdominal:      Palpations: Abdomen is soft.      Tenderness: There is no guarding.   Musculoskeletal:         General: Normal range of motion.      Cervical back: Normal range of motion and neck supple. No rigidity.   Skin:     General: Skin is warm and dry.      Coloration: Skin is not " jaundiced or pale.   Neurological:      General: No focal deficit present.      Mental Status: She is alert and oriented to person, place, and time. Mental status is at baseline.      Cranial Nerves: No cranial nerve deficit (II-XII).   Psychiatric:         Mood and Affect: Mood normal.         Behavior: Behavior normal. Behavior is cooperative.         Thought Content: Thought content normal.           FL Fluoro for Pain Management  See OP Notes for results.     IMPRESSION: See OP Notes for results.     This procedure was auto-finalized by: Virtual Radiologist       Office Visit on 10/16/2024   Component Date Value Ref Range Status    POC Amphetamines 10/16/2024 Negative  Negative, Inconclusive Final    POC Barbiturates 10/16/2024 Negative  Negative, Inconclusive Final    POC Benzodiazepines 10/16/2024 Negative  Negative, Inconclusive Final    POC Cocaine 10/16/2024 Negative  Negative, Inconclusive Final    POC THC 10/16/2024 Negative  Negative, Inconclusive Final    POC Methadone 10/16/2024 Negative  Negative, Inconclusive Final    POC Methamphetamine 10/16/2024 Negative  Negative, Inconclusive Final    POC Opiates 10/16/2024 Negative  Negative, Inconclusive Final    POC Oxycodone 10/16/2024 Negative  Negative, Inconclusive Final    POC Phencyclidine 10/16/2024 Negative  Negative, Inconclusive Final    POC Methylenedioxymethamphetamine * 10/16/2024 Negative  Negative, Inconclusive Final    POC Tricyclic Antidepressants 10/16/2024 Negative  Negative, Inconclusive Final    POC Buprenorphine 10/16/2024 Negative   Final     Acceptable 10/16/2024 Yes   Final    POC Temperature (Urine) 10/16/2024 94   Final    Creatinine, U 10/16/2024 249.9  mg/dL Final    Specific Gravity 10/16/2024 1.018   Final    pH 10/16/2024 5.5   Final    Oxidants 10/16/2024 Negative  Cutoff: 200 mg/L Final    Comment 10/16/2024 Normal   Final    Codeine 10/16/2024 Not Detected  Cutoff: 25 ng/mL Final    C6BG 10/16/2024 Not  Detected  Cutoff: 100 ng/mL Final    Morphine 10/16/2024 Not Detected  Cutoff: 25 ng/mL Final    M6BG 10/16/2024 Not Detected  Cutoff: 100 ng/mL Final    6 Monoacetylmorphine 10/16/2024 Not Detected  Cutoff: 25 ng/mL Final    Hydrocodone 10/16/2024 Not Detected  Cutoff: 25 ng/mL Final    Norhydrocodone 10/16/2024 Not Detected  Cutoff: 25 ng/mL Final    Dihydrocodeine 10/16/2024 Not Detected  Cutoff: 25 ng/mL Final    Hydromorphone 10/16/2024 Not Detected  Cutoff: 25 ng/mL Final    Hydromorphone-3-beta-glucuronide 10/16/2024 Not Detected  Cutoff: 100 ng/mL Final    Oxycodone 10/16/2024 Not Detected  Cutoff: 25 ng/mL Final    Noroxycodone 10/16/2024 Not Detected  Cutoff: 25 ng/mL Final    Oxymorphone 10/16/2024 Not Detected  Cutoff: 25 ng/mL Final    Oxymorphone-3-beta-glucuronid 10/16/2024 Not Detected  Cutoff: 100 ng/mL Final    Noroxymorphone 10/16/2024 Not Detected  Cutoff: 25 ng/mL Final    Fentanyl 10/16/2024 Not Detected  Cutoff: 2 ng/mL Final    Norfentanyl 10/16/2024 Not Detected  Cutoff: 2 ng/mL Final    Meperidine 10/16/2024 Not Detected  Cutoff: 25 ng/mL Final    Normeperidine 10/16/2024 Not Detected  Cutoff: 25 ng/mL Final    Naloxone 10/16/2024 Not Detected  Cutoff: 25 ng/mL Final    Naloxone-3-beta-glucuronide 10/16/2024 Not Detected  Cutoff: 100 ng/mL Final    Methadone 10/16/2024 Not Detected  Cutoff: 25 ng/mL Final    EDDP 10/16/2024 Not Detected  Cutoff: 25 ng/mL Final    Propoxyphene 10/16/2024 Not Detected  Cutoff: 25 ng/mL Final    Norpropoxyphene 10/16/2024 Not Detected  Cutoff: 25 ng/mL Final    Tramadol 10/16/2024 Present (A)  Cutoff: 25 ng/mL Final    O-desmethyltramadol 10/16/2024 Present (A)  Cutoff: 25 ng/mL Final    Tapentadol 10/16/2024 Not Detected  Cutoff: 25 ng/mL Final    N-Desmethyltapentadol 10/16/2024 Not Detected  Cutoff: 50 ng/mL Final    M TAPENTADOL-BETA-GLUCURONIDE 10/16/2024 Not Detected  Cutoff: 100 ng/mL Final    Buprenorphine 10/16/2024 Not Detected  Cutoff: 5 ng/mL Final     Norbuprenorphine 10/16/2024 SEE COMMENTS  Cutoff: 5 ng/mL Final    Norbuprenorphine glucuronide 10/16/2024 Not Detected  Cutoff: 20 ng/mL Final    Opioid Interpretation 10/16/2024 SEE COMMENTS   Final    Cocaine 10/16/2024 Negative  Cutoff: 150 ng/mL Final    Tetrahydrocannabinol 10/16/2024 Negative  Cutoff: 50 ng/mL Final    Alprazolam 10/16/2024 Not Detected  Cutoff: 10 ng/mL Final    Alpha-Hydroxyalprazolam 10/16/2024 Not Detected  Cutoff: 10 ng/mL Final    Alpha-Hydroxyalprazolam Glucuronide 10/16/2024 Not Detected  Cutoff: 50 ng/mL Final    Chlordiazepoxide 10/16/2024 Not Detected  Cutoff: 10 ng/mL Final    Clobazam 10/16/2024 Not Detected  Cutoff: 10 ng/mL Final    N-Desmethylclobazam 10/16/2024 Not Detected  Cutoff: 200 ng/mL Final    Clonazepam 10/16/2024 Not Detected  Cutoff: 10 ng/mL Final    7-aminoclonazepam 10/16/2024 Not Detected  Cutoff: 10 ng/mL Final    Diazepam 10/16/2024 Not Detected  Cutoff: 10 ng/mL Final    Nordiazepam 10/16/2024 Not Detected  Cutoff: 10 ng/mL Final    Flunitrazepam 10/16/2024 Not Detected  Cutoff: 10 ng/mL Final    7-aminoflunitrazepam 10/16/2024 Not Detected  Cutoff: 10 ng/mL Final    Flurazepam 10/16/2024 Not Detected  Cutoff: 10 ng/mL Final    2-Hydroxy Ethyl Flurazepam 10/16/2024 Not Detected  Cutoff: 10 ng/mL Final    Lorazepam 10/16/2024 Not Detected  Cutoff: 10 ng/mL Final    Lorazepam Glucuronide 10/16/2024 Not Detected  Cutoff: 50 ng/mL Final    Midazolam 10/16/2024 Not Detected  Cutoff: 10 ng/mL Final    Alpha-Hydroxy Midazolam 10/16/2024 Not Detected  Cutoff: 10 ng/mL Final    Oxazepam 10/16/2024 Not Detected  Cutoff: 10 ng/mL Final    Oxazepam Glucuronide 10/16/2024 Not Detected  Cutoff: 50 ng/mL Final    Prazepam 10/16/2024 Not Detected  Cutoff: 10 ng/mL Final    Temazepam 10/16/2024 Not Detected  Cutoff: 10 ng/mL Final    Temazepam Glucuronide 10/16/2024 Not Detected  Cutoff: 50 ng/mL Final    Triazolam 10/16/2024 Not Detected  Cutoff: 10 ng/mL Final     Alpha-Hydroxy Triazolam 10/16/2024 Not Detected  Cutoff: 10 ng/mL Final    Zolpidem 10/16/2024 Not Detected  Cutoff: 10 ng/mL Final    Zolpidem Phenyl-4-Carboxylic acid 10/16/2024 Not Detected  Cutoff: 10 ng/mL Final    Benzodiazepine Interpretation 10/16/2024 SEE COMMENTS   Final    List Patient's Current Medications 10/16/2024 na   Final    Methamphetamine 10/16/2024 Not Detected  Cutoff: 100 ng/mL Final    Amphetamine 10/16/2024 Not Detected  Cutoff: 100 ng/mL Final    3,4-methylenedioxymethamphetamine * 10/16/2024 Not Detected  Cutoff: 100 ng/mL Final    3,4-kxaimegfdrmhbm-G-ethylamphetam* 10/16/2024 Not Detected  Cutoff: 100 ng/mL Final    3,4-methylenedioxyamphetamine (MDA) 10/16/2024 Not Detected  Cutoff: 100 ng/mL Final    Ephedrine 10/16/2024 Not Detected  Cutoff: 100 ng/mL Final    Pseudoephedrine 10/16/2024 Not Detected  Cutoff: 100 ng/mL Final    Phentermine 10/16/2024 Not Detected  Cutoff: 100 ng/mL Final    Phencyclidine (PCP) 10/16/2024 Not Detected  Cutoff: 20 ng/mL Final    Methylphenidate 10/16/2024 Not Detected  Cutoff: 20 ng/mL Final    Ritalinic acid 10/16/2024 Not Detected  Cutoff: 100 ng/mL Final    Stimulant Interpretation 10/16/2024 SEE COMMENTS   Final    Barbiturates 10/16/2024 Negative  Cutoff: 200 ng/mL Final         Orders Placed This Encounter   Procedures    Case Request Operating Room: BLOCK, SACROILIAC JOINT     Order Specific Question:   Medical Necessity:     Answer:   Medically Non-Urgent [100]     Order Specific Question:   Case classification     Answer:   E - Elective [90]     Order Specific Question:   Is an on-site pathologist required for this procedure?     Answer:   N/A       Requested Prescriptions     Signed Prescriptions Disp Refills    celecoxib (CELEBREX) 200 MG capsule 30 capsule 0     Sig: Take 1 capsule (200 mg total) by mouth once daily.       Assessment:     1. Bilateral sacroiliitis    2. Primary osteoarthritis involving multiple joints    3. Lumbar  radiculopathy               Plan:    Not using narcotics from our office December 2024    History lumbar fusion L4/5 posterior hardware implants    Follow-up after bilateral sacroiliac injection # 1 November 12, 2024  She states she had 100% relief after procedure   Procedure did help improve her level of function    Procedure notes/fluoro images reviewed    CT lumbar spine [facility] July 26, 2024  Postop changes noted L4/5  Grade 1 anterior listhesis L4/5  L5/S1 mild Spinal conus canal stenosis moderate bilateral foraminal stenosis    Requesting options for returning bilateral sacroiliac discomfort    Requesting medication for arthritic type pain     Celebrex 200 mg 1 p.o. q.day     Continue home exercise program as directed    Tender bilateral sacroiliac area  Bilateral sacroiliac compression test positive  Facundo's /Timi's positive bilateral  Gaenslen positive bilateral  procedure done prior to surgical consideration    Ongoing Physical therapy/home exercise program as directed no longer controlling discomfort    Monitor anesthesia request is medically indicated for the scheduled nerve block procedure due to:  1- needle phobia and anxiety, placing  the patient at risk during the provided service.  2-patient has an ASA class greater than 3 and requires constant presence of an anesthesiologist during the procedure,   3-patient has severe problems hard to lie still  4-patient suffers from chronic pain and is unable to function due to  diminished ADLs    Patient's pain medication no longer helping control discomfort    Schedule bilateral sacroiliac injection # 2    Dr. Ruth      Bring original prescription medication bottles/container/box with labels to each visit

## 2024-12-11 ENCOUNTER — OFFICE VISIT (OUTPATIENT)
Dept: PAIN MEDICINE | Facility: CLINIC | Age: 74
End: 2024-12-11
Payer: MEDICARE

## 2024-12-11 VITALS
HEIGHT: 66 IN | WEIGHT: 190 LBS | SYSTOLIC BLOOD PRESSURE: 176 MMHG | RESPIRATION RATE: 17 BRPM | DIASTOLIC BLOOD PRESSURE: 79 MMHG | OXYGEN SATURATION: 98 % | BODY MASS INDEX: 30.53 KG/M2 | HEART RATE: 80 BPM

## 2024-12-11 DIAGNOSIS — M15.0 PRIMARY OSTEOARTHRITIS INVOLVING MULTIPLE JOINTS: Chronic | ICD-10-CM

## 2024-12-11 DIAGNOSIS — M46.1 BILATERAL SACROILIITIS: Primary | Chronic | ICD-10-CM

## 2024-12-11 DIAGNOSIS — M54.16 LUMBAR RADICULOPATHY: Chronic | ICD-10-CM

## 2024-12-11 PROCEDURE — 99214 OFFICE O/P EST MOD 30 MIN: CPT | Mod: S$PBB,,, | Performed by: PHYSICIAN ASSISTANT

## 2024-12-11 PROCEDURE — 1125F AMNT PAIN NOTED PAIN PRSNT: CPT | Mod: ,,, | Performed by: PHYSICIAN ASSISTANT

## 2024-12-11 PROCEDURE — 1101F PT FALLS ASSESS-DOCD LE1/YR: CPT | Mod: ,,, | Performed by: PHYSICIAN ASSISTANT

## 2024-12-11 PROCEDURE — 3008F BODY MASS INDEX DOCD: CPT | Mod: ,,, | Performed by: PHYSICIAN ASSISTANT

## 2024-12-11 PROCEDURE — 3077F SYST BP >= 140 MM HG: CPT | Mod: ,,, | Performed by: PHYSICIAN ASSISTANT

## 2024-12-11 PROCEDURE — 99215 OFFICE O/P EST HI 40 MIN: CPT | Mod: PBBFAC | Performed by: PHYSICIAN ASSISTANT

## 2024-12-11 PROCEDURE — 99999 PR PBB SHADOW E&M-EST. PATIENT-LVL V: CPT | Mod: PBBFAC,,, | Performed by: PHYSICIAN ASSISTANT

## 2024-12-11 PROCEDURE — 1159F MED LIST DOCD IN RCRD: CPT | Mod: ,,, | Performed by: PHYSICIAN ASSISTANT

## 2024-12-11 PROCEDURE — 3288F FALL RISK ASSESSMENT DOCD: CPT | Mod: ,,, | Performed by: PHYSICIAN ASSISTANT

## 2024-12-11 PROCEDURE — 3078F DIAST BP <80 MM HG: CPT | Mod: ,,, | Performed by: PHYSICIAN ASSISTANT

## 2024-12-11 RX ORDER — CELECOXIB 200 MG/1
200 CAPSULE ORAL DAILY
Qty: 30 CAPSULE | Refills: 0 | Status: SHIPPED | OUTPATIENT
Start: 2024-12-11

## 2024-12-11 NOTE — PATIENT INSTRUCTIONS

## 2024-12-17 ENCOUNTER — TELEPHONE (OUTPATIENT)
Dept: PAIN MEDICINE | Facility: CLINIC | Age: 74
End: 2024-12-17
Payer: MEDICARE

## 2024-12-17 NOTE — TELEPHONE ENCOUNTER
----- Message from Nurse Oh sent at 12/17/2024 11:17 AM CST -----  Regarding: FW: reschedule procedure    ----- Message -----  From: Barbara Em  Sent: 12/17/2024  10:44 AM CST  To: Faraz Swenson Staff  Subject: reschedule procedure                             Who Called: Regina Murray    Patient is scheduled for a BLOCK, SACROILIAC JOINT [2215] on 12/19/2024 and would like to have the procedure rescheduled for a later date.     Preferred Method of Contact: Phone Call  Patient's Preferred Phone Number on File: [phone]   Best Call Back Number, if different:  Additional Information:

## 2024-12-17 NOTE — TELEPHONE ENCOUNTER
Patient has rescheduled SI Block from 12/19/2024 to [id number] at 10:45 am. TC  ----- Message from Med Assistant Stafford sent at 12/16/2024  9:16 AM CST -----  Who Called: Regina Murray    Caller is requesting assistance/information from provider's office.    Preferred Method of Contact: Phone Call  Patient's Preferred Phone Number on File: [phone]   Best Call Back Number, if different:  Additional Information: needs to reschedule 12-19 procedure, wants to come in [place]

## 2025-01-09 ENCOUNTER — TELEPHONE (OUTPATIENT)
Dept: PAIN MEDICINE | Facility: CLINIC | Age: 75
End: 2025-01-09
Payer: MEDICARE

## 2025-01-09 NOTE — TELEPHONE ENCOUNTER
Patient has been rescheduled for procedure-SI Block with  to 01/14/2024 at 08:50 am per Patient request due to transportation. TC  ----- Message from Med Assistant Cummings sent at 1/9/2025  9:52 AM CST -----  Regarding: FW: RESCHEDULE PROCEDURE    ----- Message -----  From: Peyton Cardona  Sent: 1/9/2025   7:34 AM CST  To: Faraz Swenson Staff  Subject: RESCHEDULE PROCEDURE                             Who Called: Regina Murray        Who Left Message for Patient:  Does the patient know what this is regarding?:YES      Preferred Method of Contact: Phone Call  Patient's Preferred Phone Number on File: [phone]   Best Call Back Number, if different:  Additional Information: PATIENT SAYS HER DAUGHTER IS HAVING CAR TROUBLE AND SHE CANNOT MAKE TODAY'S APPOINTMENT. SHE WOULD LIKE TO RESCHEDULE.

## 2025-01-14 ENCOUNTER — ANESTHESIA EVENT (OUTPATIENT)
Dept: PAIN MEDICINE | Facility: HOSPITAL | Age: 75
End: 2025-01-14
Payer: MEDICARE

## 2025-01-14 ENCOUNTER — ANESTHESIA (OUTPATIENT)
Dept: PAIN MEDICINE | Facility: HOSPITAL | Age: 75
End: 2025-01-14
Payer: MEDICARE

## 2025-01-14 ENCOUNTER — HOSPITAL ENCOUNTER (OUTPATIENT)
Facility: HOSPITAL | Age: 75
Discharge: HOME | End: 2025-01-14
Attending: PAIN MEDICINE | Admitting: PAIN MEDICINE
Payer: MEDICARE

## 2025-01-14 VITALS
WEIGHT: 186 LBS | DIASTOLIC BLOOD PRESSURE: 67 MMHG | HEIGHT: 66 IN | BODY MASS INDEX: 29.89 KG/M2 | HEART RATE: 53 BPM | OXYGEN SATURATION: 100 % | RESPIRATION RATE: 11 BRPM | SYSTOLIC BLOOD PRESSURE: 153 MMHG | TEMPERATURE: 97 F

## 2025-01-14 DIAGNOSIS — M46.1 SACROILIITIS: ICD-10-CM

## 2025-01-14 PROCEDURE — 27096 INJECT SACROILIAC JOINT: CPT | Mod: 50,,, | Performed by: PAIN MEDICINE

## 2025-01-14 PROCEDURE — 63600175 PHARM REV CODE 636 W HCPCS: Performed by: PAIN MEDICINE

## 2025-01-14 PROCEDURE — D9220A PRA ANESTHESIA: Mod: ,,,

## 2025-01-14 PROCEDURE — 63600175 PHARM REV CODE 636 W HCPCS

## 2025-01-14 PROCEDURE — 27096 INJECT SACROILIAC JOINT: CPT | Mod: 50 | Performed by: PAIN MEDICINE

## 2025-01-14 PROCEDURE — 27000716 HC OXISENSOR PROBE, ANY SIZE

## 2025-01-14 PROCEDURE — 37000008 HC ANESTHESIA 1ST 15 MINUTES: Performed by: PAIN MEDICINE

## 2025-01-14 PROCEDURE — 27000284 HC CANNULA NASAL

## 2025-01-14 RX ORDER — LIDOCAINE HYDROCHLORIDE 20 MG/ML
INJECTION, SOLUTION EPIDURAL; INFILTRATION; INTRACAUDAL; PERINEURAL
Status: DISCONTINUED | OUTPATIENT
Start: 2025-01-14 | End: 2025-01-14

## 2025-01-14 RX ORDER — PROPOFOL 10 MG/ML
VIAL (ML) INTRAVENOUS
Status: DISCONTINUED | OUTPATIENT
Start: 2025-01-14 | End: 2025-01-14

## 2025-01-14 RX ORDER — BUPIVACAINE HYDROCHLORIDE 2.5 MG/ML
INJECTION, SOLUTION EPIDURAL; INFILTRATION; INTRACAUDAL CODE/TRAUMA/SEDATION MEDICATION
Status: DISCONTINUED | OUTPATIENT
Start: 2025-01-14 | End: 2025-01-14 | Stop reason: HOSPADM

## 2025-01-14 RX ORDER — SODIUM CHLORIDE 9 MG/ML
INJECTION, SOLUTION INTRAVENOUS CONTINUOUS
Status: DISCONTINUED | OUTPATIENT
Start: 2025-01-14 | End: 2025-01-14 | Stop reason: HOSPADM

## 2025-01-14 RX ORDER — TRIAMCINOLONE ACETONIDE 40 MG/ML
INJECTION, SUSPENSION INTRA-ARTICULAR; INTRAMUSCULAR CODE/TRAUMA/SEDATION MEDICATION
Status: DISCONTINUED | OUTPATIENT
Start: 2025-01-14 | End: 2025-01-14 | Stop reason: HOSPADM

## 2025-01-14 RX ADMIN — PROPOFOL 50 MG: 10 INJECTION, EMULSION INTRAVENOUS at 11:01

## 2025-01-14 RX ADMIN — LIDOCAINE HYDROCHLORIDE 80 MG: 20 INJECTION, SOLUTION EPIDURAL; INFILTRATION; INTRACAUDAL; PERINEURAL at 11:01

## 2025-01-14 RX ADMIN — PROPOFOL 30 MG: 10 INJECTION, EMULSION INTRAVENOUS at 11:01

## 2025-01-14 NOTE — BRIEF OP NOTE
The  Discharge Note  Short Stay    Admit Date: 1/14/2025    Discharge Date: 1/14/2025    Attending Physician: Leela Ruth     Discharge Provider: Leela Ruth    Diagnosis:  Bilateral sacroiliitis    Procedure performed:  Bilateral SI joint injection under fluoroscopy    Findings: Procedure tolerated well and without complications. Consistent with diagnosis.    EBL: 0cc    Specimens: None    Discharged Condition: Good    Final Diagnoses: Bilateral sacroiliitis [M46.1]    Disposition: Home or Self Care    Hospital Course: No complications, uneventful    Outcome of Hospitalization, Treatment, Procedure, or Surgery:  Patient was admitted for outpatient interventional pain management procedure. The patient tolerated the procedure well with no complications.    Follow up/Patient Instructions:  Follow up as scheduled in Pain Management office in 3-4 weeks.  Patient has received instructions and follow up date and time.    Medications:  Continue previous medications

## 2025-01-14 NOTE — DISCHARGE SUMMARY
[facility] - Pain Management  Discharge Note  Short Stay    Procedure(s) (LRB):  BLOCK, SACROILIAC JOINT (Bilateral)      OUTCOME: Patient tolerated treatment/procedure well without complication and is now ready for discharge.    DISPOSITION: Home or Self Care    FINAL DIAGNOSIS:  Bilateral sacroiliitis    FOLLOWUP: In clinic    DISCHARGE INSTRUCTIONS:  See nurse's note     TIME SPENT ON DISCHARGE: 5 minutes

## 2025-01-14 NOTE — TRANSFER OF CARE
"Anesthesia Transfer of Care Note    Patient: Regina Murray    Procedure(s) Performed: Procedure(s) (LRB):  BLOCK, SACROILIAC JOINT (Bilateral)    Patient location: Other: [facility]    Anesthesia Type: MAC    Transport from OR: Transported from OR on room air with adequate spontaneous ventilation    Post pain: adequate analgesia    Post assessment: no apparent anesthetic complications    Post vital signs: stable    Level of consciousness: sedated and responds to stimulation    Nausea/Vomiting: no nausea/vomiting    Complications: none    Transfer of care protocol was followedComments: Good SV continue, NAD noted, VSS, RTRN      Last vitals: Visit Vitals  BP (!) 118/57 (BP Location: Right arm, Patient Position: Lying)   Pulse 63   Temp 36.1 °C (97 °F) (Skin)   Resp 15   Ht 5' 6" (1.676 m)   Wt 84.4 kg (186 lb)   SpO2 100%   BMI 30.02 kg/m²     "
21-Jan-2023 11:58

## 2025-01-14 NOTE — DISCHARGE INSTRUCTIONS
No driving, operating machinery, making legal decisions, or signing legal documents until tomorrow.   Continue diet as tolerated. Drink plenty of fluids and rest.   If unable to void in 8 hours proceed to nearest ER.   Notify MD of redness or drainage from incision site as well as any fever over the next 3-4 days.  No lifting over 5 lbs for the next 24 hrs.   Continue medications as prescribed. May take pain medication as prescribed.   May shower tomorrow. No tub baths for 48 hours following procedure.   May remove bandaids tomorrow, if they fall off before tomorrow they do not have to be replaced.    If you experience any uncontrolled pain, nausea or vomiting after your procedure, do not hesitate to call the clinic.   yes

## 2025-01-14 NOTE — ANESTHESIA POSTPROCEDURE EVALUATION
Anesthesia Post Evaluation    Patient: Regina Murray    Procedure(s) Performed: Procedure(s) (LRB):  BLOCK, SACROILIAC JOINT (Bilateral)    Final Anesthesia Type: MAC      Patient location: [facility].  Patient participation: Yes- Able to Participate  Level of consciousness: awake and alert  Post-procedure vital signs: reviewed and stable  Pain management: adequate  Airway patency: patent    PONV status at discharge: No PONV  Anesthetic complications: no      Cardiovascular status: blood pressure returned to baseline, hemodynamically stable and stable  Respiratory status: unassisted  Hydration status: euvolemic  Follow-up not needed.  Comments: Pt voices appreciation for care              Vitals Value Taken Time   /67 01/14/25 1159   Temp 36.1 °C (97 °F) 01/14/25 1129   Pulse 53 01/14/25 1159   Resp 11 01/14/25 1159   SpO2 100 % 01/14/25 1159         Event Time   Out of Recovery 11:59:00         Pain/Solomon Score: Solomon Score: 10 (1/14/2025 11:47 AM)

## 2025-01-14 NOTE — PLAN OF CARE
Plan:  D/c pt via wheelchair at 1207  Informed pt if does not void in 8 hours to go to ER. Notify if redness, drainage, from injection site or fever over next 3-4 days. Rest and drink plenty of fluids for the remainder of the day. No lifting over 5 lbs. For the remainder of the day. Continue regular medications as prescribed. May take pain medications as prescribed.     Pain improved 100%  Pre-procedure pain: 9  Post-procedure pain: 0

## 2025-01-14 NOTE — H&P
Subjective:         Patient ID: Regina Murray is a 74 y.o. female.    Chief Complaint: No chief complaint on file.    Pain  This is a chronic problem. The current episode started more than 1 year ago. The problem occurs daily. The problem has been waxing and waning. Associated symptoms include arthralgias. Pertinent negatives include no anorexia, change in bowel habit, chest pain, chills, coughing, diaphoresis, fever, neck pain, rash, sore throat, swollen glands, urinary symptoms, vertigo or vomiting.     Review of Systems   Constitutional:  Negative for activity change, appetite change, chills, diaphoresis, fever and unexpected weight change.   HENT:  Negative for drooling, ear discharge, ear pain, facial swelling, nosebleeds, sore throat, trouble swallowing, voice change and goiter.    Eyes:  Negative for photophobia, pain, discharge, redness and visual disturbance.   Respiratory:  Negative for apnea, cough, choking, chest tightness, shortness of breath, wheezing and stridor.    Cardiovascular:  Negative for chest pain, palpitations and leg swelling.   Gastrointestinal:  Negative for abdominal distention, anorexia, change in bowel habit, diarrhea, rectal pain, vomiting and fecal incontinence.   Endocrine: Negative for cold intolerance, heat intolerance, polydipsia, polyphagia and polyuria.   Genitourinary:  Negative for bladder incontinence, dysuria, flank pain, frequency and hot flashes.   Musculoskeletal:  Positive for arthralgias, back pain and leg pain. Negative for neck pain.   Integumentary:  Negative for color change, pallor and rash.   Allergic/Immunologic: Negative for immunocompromised state.   Neurological:  Negative for dizziness, vertigo, seizures, syncope, facial asymmetry, speech difficulty, light-headedness, memory loss and coordination difficulties.   Hematological:  Negative for adenopathy. Does not bruise/bleed easily.   Psychiatric/Behavioral:  Negative for agitation, behavioral problems,  confusion, decreased concentration, dysphoric mood, hallucinations, self-injury and suicidal ideas. The patient is not nervous/anxious and is not hyperactive.            History reviewed. No pertinent past medical history.  Past Surgical History:   Procedure Laterality Date    HYSTERECTOMY      INJECTION OF ANESTHETIC AGENT INTO SACROILIAC JOINT Bilateral 11/12/2024    Procedure: BLOCK, SACROILIAC JOINT;  Surgeon: Leela Ruth MD;  Location: [facility];  Service: Pain Management;  Laterality: Bilateral;    KNEE SURGERY Left      Social History     Socioeconomic History    Marital status:    Tobacco Use    Smoking status: Never    Smokeless tobacco: Never   Substance and Sexual Activity    Alcohol use: Not Currently     No family history on file.  Review of patient's allergies indicates:   Allergen Reactions    Penicillins Itching        Objective:  There were no vitals filed for this visit.        Physical Exam  Vitals and nursing note reviewed. Exam conducted with a chaperone present.   Constitutional:       General: She is awake. She is not in acute distress.     Appearance: Normal appearance. She is not ill-appearing, toxic-appearing or diaphoretic.   HENT:      Head: Normocephalic and atraumatic.      Nose: Nose normal.      Mouth/Throat:      Mouth: Mucous membranes are moist.      Pharynx: Oropharynx is clear.   Eyes:      Conjunctiva/sclera: Conjunctivae normal.      Pupils: Pupils are equal, round, and reactive to light.   Cardiovascular:      Rate and Rhythm: Normal rate.   Pulmonary:      Effort: Pulmonary effort is normal. No respiratory distress.   Abdominal:      Palpations: Abdomen is soft.      Tenderness: There is no guarding.   Musculoskeletal:         General: Normal range of motion.      Cervical back: Normal range of motion and neck supple. No rigidity.   Skin:     General: Skin is warm and dry.      Coloration: Skin is not jaundiced or pale.   Neurological:      General: No  focal deficit present.      Mental Status: She is alert and oriented to person, place, and time. Mental status is at baseline.      Cranial Nerves: No cranial nerve deficit (II-XII).   Psychiatric:         Mood and Affect: Mood normal.         Behavior: Behavior normal. Behavior is cooperative.         Thought Content: Thought content normal.           FL Fluoro for Pain Management  See OP Notes for results.     IMPRESSION: See OP Notes for results.     This procedure was auto-finalized by: Virtual Radiologist       Office Visit on 10/16/2024   Component Date Value Ref Range Status    POC Amphetamines 10/16/2024 Negative  Negative, Inconclusive Final    POC Barbiturates 10/16/2024 Negative  Negative, Inconclusive Final    POC Benzodiazepines 10/16/2024 Negative  Negative, Inconclusive Final    POC Cocaine 10/16/2024 Negative  Negative, Inconclusive Final    POC THC 10/16/2024 Negative  Negative, Inconclusive Final    POC Methadone 10/16/2024 Negative  Negative, Inconclusive Final    POC Methamphetamine 10/16/2024 Negative  Negative, Inconclusive Final    POC Opiates 10/16/2024 Negative  Negative, Inconclusive Final    POC Oxycodone 10/16/2024 Negative  Negative, Inconclusive Final    POC Phencyclidine 10/16/2024 Negative  Negative, Inconclusive Final    POC Methylenedioxymethamphetamine * 10/16/2024 Negative  Negative, Inconclusive Final    POC Tricyclic Antidepressants 10/16/2024 Negative  Negative, Inconclusive Final    POC Buprenorphine 10/16/2024 Negative   Final     Acceptable 10/16/2024 Yes   Final    POC Temperature (Urine) 10/16/2024 94   Final    Creatinine, U 10/16/2024 249.9  mg/dL Final    Specific Gravity 10/16/2024 1.018   Final    pH 10/16/2024 5.5   Final    Oxidants 10/16/2024 Negative  Cutoff: 200 mg/L Final    Comment 10/16/2024 Normal   Final    Codeine 10/16/2024 Not Detected  Cutoff: 25 ng/mL Final    C6BG 10/16/2024 Not Detected  Cutoff: 100 ng/mL Final    Morphine 10/16/2024  Not Detected  Cutoff: 25 ng/mL Final    M6BG 10/16/2024 Not Detected  Cutoff: 100 ng/mL Final    6 Monoacetylmorphine 10/16/2024 Not Detected  Cutoff: 25 ng/mL Final    Hydrocodone 10/16/2024 Not Detected  Cutoff: 25 ng/mL Final    Norhydrocodone 10/16/2024 Not Detected  Cutoff: 25 ng/mL Final    Dihydrocodeine 10/16/2024 Not Detected  Cutoff: 25 ng/mL Final    Hydromorphone 10/16/2024 Not Detected  Cutoff: 25 ng/mL Final    Hydromorphone-3-beta-glucuronide 10/16/2024 Not Detected  Cutoff: 100 ng/mL Final    Oxycodone 10/16/2024 Not Detected  Cutoff: 25 ng/mL Final    Noroxycodone 10/16/2024 Not Detected  Cutoff: 25 ng/mL Final    Oxymorphone 10/16/2024 Not Detected  Cutoff: 25 ng/mL Final    Oxymorphone-3-beta-glucuronid 10/16/2024 Not Detected  Cutoff: 100 ng/mL Final    Noroxymorphone 10/16/2024 Not Detected  Cutoff: 25 ng/mL Final    Fentanyl 10/16/2024 Not Detected  Cutoff: 2 ng/mL Final    Norfentanyl 10/16/2024 Not Detected  Cutoff: 2 ng/mL Final    Meperidine 10/16/2024 Not Detected  Cutoff: 25 ng/mL Final    Normeperidine 10/16/2024 Not Detected  Cutoff: 25 ng/mL Final    Naloxone 10/16/2024 Not Detected  Cutoff: 25 ng/mL Final    Naloxone-3-beta-glucuronide 10/16/2024 Not Detected  Cutoff: 100 ng/mL Final    Methadone 10/16/2024 Not Detected  Cutoff: 25 ng/mL Final    EDDP 10/16/2024 Not Detected  Cutoff: 25 ng/mL Final    Propoxyphene 10/16/2024 Not Detected  Cutoff: 25 ng/mL Final    Norpropoxyphene 10/16/2024 Not Detected  Cutoff: 25 ng/mL Final    Tramadol 10/16/2024 Present (A)  Cutoff: 25 ng/mL Final    O-desmethyltramadol 10/16/2024 Present (A)  Cutoff: 25 ng/mL Final    Tapentadol 10/16/2024 Not Detected  Cutoff: 25 ng/mL Final    N-Desmethyltapentadol 10/16/2024 Not Detected  Cutoff: 50 ng/mL Final    M TAPENTADOL-BETA-GLUCURONIDE 10/16/2024 Not Detected  Cutoff: 100 ng/mL Final    Buprenorphine 10/16/2024 Not Detected  Cutoff: 5 ng/mL Final    Norbuprenorphine 10/16/2024 SEE COMMENTS  Cutoff: 5  ng/mL Final    Norbuprenorphine glucuronide 10/16/2024 Not Detected  Cutoff: 20 ng/mL Final    Opioid Interpretation 10/16/2024 SEE COMMENTS   Final    Cocaine 10/16/2024 Negative  Cutoff: 150 ng/mL Final    Tetrahydrocannabinol 10/16/2024 Negative  Cutoff: 50 ng/mL Final    Alprazolam 10/16/2024 Not Detected  Cutoff: 10 ng/mL Final    Alpha-Hydroxyalprazolam 10/16/2024 Not Detected  Cutoff: 10 ng/mL Final    Alpha-Hydroxyalprazolam Glucuronide 10/16/2024 Not Detected  Cutoff: 50 ng/mL Final    Chlordiazepoxide 10/16/2024 Not Detected  Cutoff: 10 ng/mL Final    Clobazam 10/16/2024 Not Detected  Cutoff: 10 ng/mL Final    N-Desmethylclobazam 10/16/2024 Not Detected  Cutoff: 200 ng/mL Final    Clonazepam 10/16/2024 Not Detected  Cutoff: 10 ng/mL Final    7-aminoclonazepam 10/16/2024 Not Detected  Cutoff: 10 ng/mL Final    Diazepam 10/16/2024 Not Detected  Cutoff: 10 ng/mL Final    Nordiazepam 10/16/2024 Not Detected  Cutoff: 10 ng/mL Final    Flunitrazepam 10/16/2024 Not Detected  Cutoff: 10 ng/mL Final    7-aminoflunitrazepam 10/16/2024 Not Detected  Cutoff: 10 ng/mL Final    Flurazepam 10/16/2024 Not Detected  Cutoff: 10 ng/mL Final    2-Hydroxy Ethyl Flurazepam 10/16/2024 Not Detected  Cutoff: 10 ng/mL Final    Lorazepam 10/16/2024 Not Detected  Cutoff: 10 ng/mL Final    Lorazepam Glucuronide 10/16/2024 Not Detected  Cutoff: 50 ng/mL Final    Midazolam 10/16/2024 Not Detected  Cutoff: 10 ng/mL Final    Alpha-Hydroxy Midazolam 10/16/2024 Not Detected  Cutoff: 10 ng/mL Final    Oxazepam 10/16/2024 Not Detected  Cutoff: 10 ng/mL Final    Oxazepam Glucuronide 10/16/2024 Not Detected  Cutoff: 50 ng/mL Final    Prazepam 10/16/2024 Not Detected  Cutoff: 10 ng/mL Final    Temazepam 10/16/2024 Not Detected  Cutoff: 10 ng/mL Final    Temazepam Glucuronide 10/16/2024 Not Detected  Cutoff: 50 ng/mL Final    Triazolam 10/16/2024 Not Detected  Cutoff: 10 ng/mL Final    Alpha-Hydroxy Triazolam 10/16/2024 Not Detected  Cutoff: 10  ng/mL Final    Zolpidem 10/16/2024 Not Detected  Cutoff: 10 ng/mL Final    Zolpidem Phenyl-4-Carboxylic acid 10/16/2024 Not Detected  Cutoff: 10 ng/mL Final    Benzodiazepine Interpretation 10/16/2024 SEE COMMENTS   Final    List Patient's Current Medications 10/16/2024 na   Final    Methamphetamine 10/16/2024 Not Detected  Cutoff: 100 ng/mL Final    Amphetamine 10/16/2024 Not Detected  Cutoff: 100 ng/mL Final    3,4-methylenedioxymethamphetamine * 10/16/2024 Not Detected  Cutoff: 100 ng/mL Final    3,7-krqbcpfclxrggm-E-ethylamphetam* 10/16/2024 Not Detected  Cutoff: 100 ng/mL Final    3,4-methylenedioxyamphetamine (MDA) 10/16/2024 Not Detected  Cutoff: 100 ng/mL Final    Ephedrine 10/16/2024 Not Detected  Cutoff: 100 ng/mL Final    Pseudoephedrine 10/16/2024 Not Detected  Cutoff: 100 ng/mL Final    Phentermine 10/16/2024 Not Detected  Cutoff: 100 ng/mL Final    Phencyclidine (PCP) 10/16/2024 Not Detected  Cutoff: 20 ng/mL Final    Methylphenidate 10/16/2024 Not Detected  Cutoff: 20 ng/mL Final    Ritalinic acid 10/16/2024 Not Detected  Cutoff: 100 ng/mL Final    Stimulant Interpretation 10/16/2024 SEE COMMENTS   Final    Barbiturates 10/16/2024 Negative  Cutoff: 200 ng/mL Final         Orders Placed This Encounter   Procedures    FL Fluoro for Pain Management     Standing Status:   Standing     Number of Occurrences:   1     Order Specific Question:   Reason for exam:     Answer:   Enrike RADFORD     Order Specific Question:   May the Radiologist modify the order per protocol to meet the clinical needs of the patient?     Answer:   Yes     Order Specific Question:   Release to patient     Answer:   Immediate    Diet NPO     6 hours prior to case     Standing Status:   Standing     Number of Occurrences:   1    Vital signs     Standing Status:   Standing     Number of Occurrences:   1    Verify informed consent     Standing Status:   Standing     Number of Occurrences:   1    Saline lock IV     Standing Status:    Standing     Number of Occurrences:   1    Notify physician      Standing Status:   Standing     Number of Occurrences:   1     Order Specific Question:   Systolic Blood Pressure SBP greater than or equal to     Answer:   200     Order Specific Question:   Diastolic Blood Pressure DBP greater than or equal to     Answer:   100    Pulse Oximetry Continuous     Standing Status:   Standing     Number of Occurrences:   1    POCT urine pregnancy     Standing Status:   Standing     Number of Occurrences:   1    POCT glucose     On arrival for diabetics     Standing Status:   Standing     Number of Occurrences:   1    Place 18-22 gauage peripheral IV      Can place 24 gauge IV for difficult IV access     Standing Status:   Standing     Number of Occurrences:   1    Place in Outpatient     Standing Status:   Standing     Number of Occurrences:   1     Order Specific Question:   Admitting Provider     Answer:   LYNDSEY RUTLEDGE [[id number]]     Order Specific Question:   Diagnosis     Answer:   Sacroiliitis [[id number]]     Order Specific Question:   Is the Future Attending Known?     Answer:   Yes     Order Specific Question:   Future Attending Provider     Answer:   LYNDSEY RUTLEDGE [[id number]]     Order Specific Question:   Special Needs:     Answer:   No Special Needs [1]       Requested Prescriptions     Signed Prescriptions Disp Refills    celecoxib (CELEBREX) 200 MG capsule 30 capsule 0     Sig: Take 1 capsule (200 mg total) by mouth once daily.       Assessment:     1. Sacroiliitis               Plan:    Not using narcotics from our office December 2024    History lumbar fusion L4/5 posterior hardware implants    Follow-up after bilateral sacroiliac injection # 1 November 12, 2024  She states she had 100% relief after procedure   Procedure did help improve her level of function    Procedure notes/fluoro images reviewed    CT lumbar spine [facility] July 26, 2024  Postop changes noted L4/5  Grade 1 anterior listhesis  L4/5  L5/S1 mild Spinal conus canal stenosis moderate bilateral foraminal stenosis    Requesting options for returning bilateral sacroiliac discomfort    Requesting medication for arthritic type pain     Celebrex 200 mg 1 p.o. q.day     Continue home exercise program as directed    Tender bilateral sacroiliac area  Bilateral sacroiliac compression test positive  Facundo's /Timi's positive bilateral  Gaenslen positive bilateral  procedure done prior to surgical consideration    Ongoing Physical therapy/home exercise program as directed no longer controlling discomfort    Monitor anesthesia request is medically indicated for the scheduled nerve block procedure due to:  1- needle phobia and anxiety, placing  the patient at risk during the provided service.  2-patient has an ASA class greater than 3 and requires constant presence of an anesthesiologist during the procedure,   3-patient has severe problems hard to lie still  4-patient suffers from chronic pain and is unable to function due to  diminished ADLs    Patient's pain medication no longer helping control discomfort    Schedule bilateral sacroiliac injection # 2    Dr. Ruth      Bring original prescription medication bottles/container/box with labels to each visit           Review of Systems   Constitutional:  Negative for activity change, appetite change, chills, diaphoresis, fever and unexpected weight change.   HENT:  Negative for drooling, ear discharge, ear pain, facial swelling, nosebleeds, sore throat, trouble swallowing and voice change.    Eyes:  Negative for photophobia, pain, discharge, redness and visual disturbance.   Respiratory:  Negative for apnea, cough, choking, chest tightness, shortness of breath, wheezing and stridor.    Cardiovascular:  Negative for chest pain, palpitations and leg swelling.   Gastrointestinal:  Negative for abdominal distention, anorexia, change in bowel habit, diarrhea, rectal pain and vomiting.   Genitourinary:   Negative for bladder incontinence, dysuria, flank pain and frequency.   Musculoskeletal:  Positive for arthralgias and back pain. Negative for neck pain.   Skin:  Negative for color change, pallor and rash.   Neurological:  Negative for dizziness, vertigo, seizures, syncope, facial asymmetry, speech difficulty, light-headedness and disturbances in coordination.   Endo/Heme/Allergies:  Negative for cold intolerance, heat intolerance, polydipsia, polyphagia and adenopathy. Does not bruise/bleed easily.   Psychiatric/Behavioral:  Negative for agitation, behavioral problems, confusion, decreased concentration, dysphoric mood, hallucinations, self-injury and suicidal ideas. The patient is not nervous/anxious and is not hyperactive.

## 2025-01-14 NOTE — OP NOTE
Procedure Note    Procedure Date: 1/14/2025    Procedure Performed: Bilateral Sacroiliac joint injection, with Fluoroscopic Guidance    Indications: Patient has failed conservative therapy.      Pre-op diagnosis:  Bilateral Sacroiliitis    Post-op diagnosis: same    Physician: KEILA Ruth MD    Anesthesia: MAC    Medications injected: 0.25% Bupivacaine, Kenalog 40mg    Local anesthetic used: 1% Lidocaine, 2ml    Estimated Blood Loss: None    Complications:None    Technique:  The patient was interviewed in the holding area and Risks/Benefits were discussed, including, but not limited to, the possibility of new or different pain, bleeding or infection.   All questions were answered.  The patient understood and accepted risks.  Consent was reviewed and signed.   A time out was taken to identify the patient, procedure and side of the procedure.  The skin was prepped with chloroprep and sterile drapes were applied. The bilateral SI joint was identified by fluoroscopy in an oblique plane. Skin and subcutaneous tissues overyling the target area was anesthetized with 1-2 mL of Lidocaine 1%.  A 22g 3 1/2 inch spinal needle was advanced under intermittent fluoroscopy until joint space was entered at the junction of the superior 2/3 & inferior 1/3 of the joint.  There was no paresthesia with needle placement. Aspiration was negative for blood. Next, a 2 cc solution from a mixture of 40 mg kenalog and 3 mL of 0.25% Marcaine was injected without difficulty or resistance into each joint. The needle was removed and a sterile Band-Aid dressing was applied to the puncture site. The patient tolerated the procedure well.  The patient was monitored after the procedure.  The patient was given post procedure and discharge instructions to follow at home. The patient was discharged in a stable condition

## 2025-01-14 NOTE — ANESTHESIA PREPROCEDURE EVALUATION
01/14/2025  Regina Murray is a 74 y.o., female  No current facility-administered medications on file prior to encounter.     Current Outpatient Medications on File Prior to Encounter   Medication Sig Dispense Refill    amLODIPine (NORVASC) 10 MG tablet Take 10 mg by mouth once daily.      ferrous sulfate (FEOSOL) Tab tablet Take 1 tablet by mouth once daily.      hydroCHLOROthiazide (HYDRODIURIL) 25 MG tablet Take 25 mg by mouth once daily.      celecoxib (CELEBREX) 200 MG capsule Take 1 capsule (200 mg total) by mouth once daily. 30 capsule 0     Active Ambulatory Problems     Diagnosis Date Noted    Primary osteoarthritis involving multiple joints 12/11/2024     Resolved Ambulatory Problems     Diagnosis Date Noted    No Resolved Ambulatory Problems     No Additional Past Medical History     Past Surgical History:   Procedure Laterality Date    HYSTERECTOMY      INJECTION OF ANESTHETIC AGENT INTO SACROILIAC JOINT Bilateral 11/12/2024    Procedure: BLOCK, SACROILIAC JOINT;  Surgeon: Leela Ruth MD;  [facility];  Service: Pain Management;  Laterality: Bilateral;    KNEE SURGERY Left          Pre-op Assessment    I have reviewed the Patient Summary Reports.     I have reviewed the Nursing Notes. I have reviewed the NPO Status.   I have reviewed the Medications.     Review of Systems  Anesthesia Hx:  No problems with previous Anesthesia             Denies Family Hx of Anesthesia complications.    Denies Personal Hx of Anesthesia complications.                    Social:  Non-Smoker, Social Alcohol Use       Hematology/Oncology:  Hematology Normal   Oncology Normal                                   EENT/Dental:  EENT/Dental Normal           Cardiovascular:     Hypertension                                          Pulmonary:  Pulmonary Normal                       Renal/:  Renal/  Normal                 Hepatic/GI:  Hepatic/GI Normal                    Musculoskeletal:  Arthritis          Spine Disorders: lumbar Chronic Pain           Neurological:  Neurology Normal                                      Endocrine:  Endocrine Normal            Dermatological:  Skin Normal    Psych:  Psychiatric Normal                    Physical Exam  General: Well nourished, Cooperative, Alert and Oriented    Airway:  Mallampati: II   Mouth Opening: Normal  TM Distance: Normal  Tongue: Normal  Neck ROM: Normal ROM    Dental:  Dentures    Chest/Lungs:  Normal Respiratory Rate        Anesthesia Plan  Type of Anesthesia, risks & benefits discussed:    Anesthesia Type: MAC  Intra-op Monitoring Plan: Standard ASA Monitors  Post Op Pain Control Plan: multimodal analgesia  Induction:  IV  Informed Consent: Informed consent signed with the Patient and all parties understand the risks and agree with anesthesia plan.  All questions answered. Patient consented to blood products? Yes  ASA Score: 2  Day of Surgery Review of History & Physical: H&P Update referred to the surgeon/provider.I have interviewed and examined the patient. I have reviewed the patient's H&P dated: There are no significant changes.     Ready For Surgery From Anesthesia Perspective.     .

## 2025-01-29 NOTE — PROGRESS NOTES
Subjective:         Patient ID: Regina Murray is a 74 y.o. female.    Chief Complaint: Follow-up      Pain  This is a chronic problem. The current episode started more than 1 year ago. The problem occurs daily. The problem has been gradually improving. Associated symptoms include arthralgias. Pertinent negatives include no anorexia, change in bowel habit, chest pain, chills, coughing, diaphoresis, fever, neck pain, rash, sore throat, swollen glands, urinary symptoms, vertigo or vomiting.     Review of Systems   Constitutional:  Negative for activity change, appetite change, chills, diaphoresis, fever and unexpected weight change.   HENT:  Negative for drooling, ear discharge, ear pain, facial swelling, nosebleeds, sore throat, trouble swallowing, voice change and goiter.    Eyes:  Negative for photophobia, pain, discharge, redness and visual disturbance.   Respiratory:  Negative for apnea, cough, choking, chest tightness, shortness of breath, wheezing and stridor.    Cardiovascular:  Negative for chest pain, palpitations and leg swelling.   Gastrointestinal:  Negative for abdominal distention, anorexia, change in bowel habit, diarrhea, rectal pain, vomiting and fecal incontinence.   Endocrine: Negative for cold intolerance, heat intolerance, polydipsia, polyphagia and polyuria.   Genitourinary:  Negative for bladder incontinence, dysuria, flank pain, frequency and hot flashes.   Musculoskeletal:  Positive for arthralgias, back pain and leg pain. Negative for neck pain.   Integumentary:  Negative for color change, pallor and rash.   Allergic/Immunologic: Negative for immunocompromised state.   Neurological:  Negative for dizziness, vertigo, seizures, syncope, facial asymmetry, speech difficulty, light-headedness, memory loss and coordination difficulties.   Hematological:  Negative for adenopathy. Does not bruise/bleed easily.   Psychiatric/Behavioral:  Negative for agitation, behavioral problems, confusion,  decreased concentration, dysphoric mood, hallucinations, self-injury and suicidal ideas. The patient is not nervous/anxious and is not hyperactive.            History reviewed. No pertinent past medical history.  Past Surgical History:   Procedure Laterality Date    HYSTERECTOMY      INJECTION OF ANESTHETIC AGENT INTO SACROILIAC JOINT Bilateral 11/12/2024    Procedure: BLOCK, SACROILIAC JOINT;  Surgeon: Leela Ruth MD;  Location: [facility];  Service: Pain Management;  Laterality: Bilateral;    INJECTION OF ANESTHETIC AGENT INTO SACROILIAC JOINT Bilateral 1/14/2025    Procedure: BLOCK, SACROILIAC JOINT;  Surgeon: Leela Ruth MD;  Location: [facility];  Service: Pain Management;  Laterality: Bilateral;    KNEE SURGERY Left      Social History     Socioeconomic History    Marital status:    Tobacco Use    Smoking status: Never    Smokeless tobacco: Never   Substance and Sexual Activity    Alcohol use: Not Currently     No family history on file.  Review of patient's allergies indicates:   Allergen Reactions    Penicillins Itching        Objective:  Vitals:    02/05/25 0958   BP: (!) 141/74   Pulse: 79   PainSc: 0-No pain           Physical Exam  Vitals and nursing note reviewed. Exam conducted with a chaperone present.   Constitutional:       General: She is awake. She is not in acute distress.     Appearance: Normal appearance. She is not ill-appearing, toxic-appearing or diaphoretic.   HENT:      Head: Normocephalic and atraumatic.      Nose: Nose normal.      Mouth/Throat:      Mouth: Mucous membranes are moist.      Pharynx: Oropharynx is clear.   Eyes:      Conjunctiva/sclera: Conjunctivae normal.      Pupils: Pupils are equal, round, and reactive to light.   Cardiovascular:      Rate and Rhythm: Normal rate.   Pulmonary:      Effort: Pulmonary effort is normal. No respiratory distress.   Abdominal:      Palpations: Abdomen is soft.      Tenderness: There is no guarding.    Musculoskeletal:         General: Normal range of motion.      Cervical back: Normal range of motion and neck supple. No rigidity.   Skin:     General: Skin is warm and dry.      Coloration: Skin is not jaundiced or pale.   Neurological:      General: No focal deficit present.      Mental Status: She is alert and oriented to person, place, and time. Mental status is at baseline.      Cranial Nerves: No cranial nerve deficit (II-XII).   Psychiatric:         Mood and Affect: Mood normal.         Behavior: Behavior normal. Behavior is cooperative.         Thought Content: Thought content normal.           FL Fluoro for Pain Management  See OP Notes for results.     IMPRESSION: See OP Notes for results.     This procedure was auto-finalized by: Virtual Radiologist       Office Visit on 10/16/2024   Component Date Value Ref Range Status    POC Amphetamines 10/16/2024 Negative  Negative, Inconclusive Final    POC Barbiturates 10/16/2024 Negative  Negative, Inconclusive Final    POC Benzodiazepines 10/16/2024 Negative  Negative, Inconclusive Final    POC Cocaine 10/16/2024 Negative  Negative, Inconclusive Final    POC THC 10/16/2024 Negative  Negative, Inconclusive Final    POC Methadone 10/16/2024 Negative  Negative, Inconclusive Final    POC Methamphetamine 10/16/2024 Negative  Negative, Inconclusive Final    POC Opiates 10/16/2024 Negative  Negative, Inconclusive Final    POC Oxycodone 10/16/2024 Negative  Negative, Inconclusive Final    POC Phencyclidine 10/16/2024 Negative  Negative, Inconclusive Final    POC Methylenedioxymethamphetamine * 10/16/2024 Negative  Negative, Inconclusive Final    POC Tricyclic Antidepressants 10/16/2024 Negative  Negative, Inconclusive Final    POC Buprenorphine 10/16/2024 Negative   Final     Acceptable 10/16/2024 Yes   Final    POC Temperature (Urine) 10/16/2024 94   Final    Creatinine, U 10/16/2024 249.9  mg/dL Final    Specific Gravity 10/16/2024 1.018   Final    pH  10/16/2024 5.5   Final    Oxidants 10/16/2024 Negative  Cutoff: 200 mg/L Final    Comment 10/16/2024 Normal   Final    Codeine 10/16/2024 Not Detected  Cutoff: 25 ng/mL Final    C6BG 10/16/2024 Not Detected  Cutoff: 100 ng/mL Final    Morphine 10/16/2024 Not Detected  Cutoff: 25 ng/mL Final    M6BG 10/16/2024 Not Detected  Cutoff: 100 ng/mL Final    6 Monoacetylmorphine 10/16/2024 Not Detected  Cutoff: 25 ng/mL Final    Hydrocodone 10/16/2024 Not Detected  Cutoff: 25 ng/mL Final    Norhydrocodone 10/16/2024 Not Detected  Cutoff: 25 ng/mL Final    Dihydrocodeine 10/16/2024 Not Detected  Cutoff: 25 ng/mL Final    Hydromorphone 10/16/2024 Not Detected  Cutoff: 25 ng/mL Final    Hydromorphone-3-beta-glucuronide 10/16/2024 Not Detected  Cutoff: 100 ng/mL Final    Oxycodone 10/16/2024 Not Detected  Cutoff: 25 ng/mL Final    Noroxycodone 10/16/2024 Not Detected  Cutoff: 25 ng/mL Final    Oxymorphone 10/16/2024 Not Detected  Cutoff: 25 ng/mL Final    Oxymorphone-3-beta-glucuronid 10/16/2024 Not Detected  Cutoff: 100 ng/mL Final    Noroxymorphone 10/16/2024 Not Detected  Cutoff: 25 ng/mL Final    Fentanyl 10/16/2024 Not Detected  Cutoff: 2 ng/mL Final    Norfentanyl 10/16/2024 Not Detected  Cutoff: 2 ng/mL Final    Meperidine 10/16/2024 Not Detected  Cutoff: 25 ng/mL Final    Normeperidine 10/16/2024 Not Detected  Cutoff: 25 ng/mL Final    Naloxone 10/16/2024 Not Detected  Cutoff: 25 ng/mL Final    Naloxone-3-beta-glucuronide 10/16/2024 Not Detected  Cutoff: 100 ng/mL Final    Methadone 10/16/2024 Not Detected  Cutoff: 25 ng/mL Final    EDDP 10/16/2024 Not Detected  Cutoff: 25 ng/mL Final    Propoxyphene 10/16/2024 Not Detected  Cutoff: 25 ng/mL Final    Norpropoxyphene 10/16/2024 Not Detected  Cutoff: 25 ng/mL Final    Tramadol 10/16/2024 Present (A)  Cutoff: 25 ng/mL Final    O-desmethyltramadol 10/16/2024 Present (A)  Cutoff: 25 ng/mL Final    Tapentadol 10/16/2024 Not Detected  Cutoff: 25 ng/mL Final     N-Desmethyltapentadol 10/16/2024 Not Detected  Cutoff: 50 ng/mL Final    M TAPENTADOL-BETA-GLUCURONIDE 10/16/2024 Not Detected  Cutoff: 100 ng/mL Final    Buprenorphine 10/16/2024 Not Detected  Cutoff: 5 ng/mL Final    Norbuprenorphine 10/16/2024 SEE COMMENTS  Cutoff: 5 ng/mL Final    Norbuprenorphine glucuronide 10/16/2024 Not Detected  Cutoff: 20 ng/mL Final    Opioid Interpretation 10/16/2024 SEE COMMENTS   Final    Cocaine 10/16/2024 Negative  Cutoff: 150 ng/mL Final    Tetrahydrocannabinol 10/16/2024 Negative  Cutoff: 50 ng/mL Final    Alprazolam 10/16/2024 Not Detected  Cutoff: 10 ng/mL Final    Alpha-Hydroxyalprazolam 10/16/2024 Not Detected  Cutoff: 10 ng/mL Final    Alpha-Hydroxyalprazolam Glucuronide 10/16/2024 Not Detected  Cutoff: 50 ng/mL Final    Chlordiazepoxide 10/16/2024 Not Detected  Cutoff: 10 ng/mL Final    Clobazam 10/16/2024 Not Detected  Cutoff: 10 ng/mL Final    N-Desmethylclobazam 10/16/2024 Not Detected  Cutoff: 200 ng/mL Final    Clonazepam 10/16/2024 Not Detected  Cutoff: 10 ng/mL Final    7-aminoclonazepam 10/16/2024 Not Detected  Cutoff: 10 ng/mL Final    Diazepam 10/16/2024 Not Detected  Cutoff: 10 ng/mL Final    Nordiazepam 10/16/2024 Not Detected  Cutoff: 10 ng/mL Final    Flunitrazepam 10/16/2024 Not Detected  Cutoff: 10 ng/mL Final    7-aminoflunitrazepam 10/16/2024 Not Detected  Cutoff: 10 ng/mL Final    Flurazepam 10/16/2024 Not Detected  Cutoff: 10 ng/mL Final    2-Hydroxy Ethyl Flurazepam 10/16/2024 Not Detected  Cutoff: 10 ng/mL Final    Lorazepam 10/16/2024 Not Detected  Cutoff: 10 ng/mL Final    Lorazepam Glucuronide 10/16/2024 Not Detected  Cutoff: 50 ng/mL Final    Midazolam 10/16/2024 Not Detected  Cutoff: 10 ng/mL Final    Alpha-Hydroxy Midazolam 10/16/2024 Not Detected  Cutoff: 10 ng/mL Final    Oxazepam 10/16/2024 Not Detected  Cutoff: 10 ng/mL Final    Oxazepam Glucuronide 10/16/2024 Not Detected  Cutoff: 50 ng/mL Final    Prazepam 10/16/2024 Not Detected  Cutoff: 10  ng/mL Final    Temazepam 10/16/2024 Not Detected  Cutoff: 10 ng/mL Final    Temazepam Glucuronide 10/16/2024 Not Detected  Cutoff: 50 ng/mL Final    Triazolam 10/16/2024 Not Detected  Cutoff: 10 ng/mL Final    Alpha-Hydroxy Triazolam 10/16/2024 Not Detected  Cutoff: 10 ng/mL Final    Zolpidem 10/16/2024 Not Detected  Cutoff: 10 ng/mL Final    Zolpidem Phenyl-4-Carboxylic acid 10/16/2024 Not Detected  Cutoff: 10 ng/mL Final    Benzodiazepine Interpretation 10/16/2024 SEE COMMENTS   Final    List Patient's Current Medications 10/16/2024 na   Final    Methamphetamine 10/16/2024 Not Detected  Cutoff: 100 ng/mL Final    Amphetamine 10/16/2024 Not Detected  Cutoff: 100 ng/mL Final    3,4-methylenedioxymethamphetamine * 10/16/2024 Not Detected  Cutoff: 100 ng/mL Final    3,9-htzjhjrkkvksiu-W-ethylamphetam* 10/16/2024 Not Detected  Cutoff: 100 ng/mL Final    3,4-methylenedioxyamphetamine (MDA) 10/16/2024 Not Detected  Cutoff: 100 ng/mL Final    Ephedrine 10/16/2024 Not Detected  Cutoff: 100 ng/mL Final    Pseudoephedrine 10/16/2024 Not Detected  Cutoff: 100 ng/mL Final    Phentermine 10/16/2024 Not Detected  Cutoff: 100 ng/mL Final    Phencyclidine (PCP) 10/16/2024 Not Detected  Cutoff: 20 ng/mL Final    Methylphenidate 10/16/2024 Not Detected  Cutoff: 20 ng/mL Final    Ritalinic acid 10/16/2024 Not Detected  Cutoff: 100 ng/mL Final    Stimulant Interpretation 10/16/2024 SEE COMMENTS   Final    Barbiturates 10/16/2024 Negative  Cutoff: 200 ng/mL Final         No orders of the defined types were placed in this encounter.      Requested Prescriptions     Signed Prescriptions Disp Refills    meloxicam (MOBIC) 15 MG tablet 30 tablet 0     Sig: Take 1 tablet (15 mg total) by mouth once daily.       Assessment:     1. Sacroiliitis    2. Lumbar radiculopathy             CT lumbar spine [facility] July 26, 2024  Postop changes noted L4/5  Grade 1 anterior listhesis L4/5  L5/S1 mild Spinal conus canal stenosis moderate bilateral  foraminal stenosis        Plan:    Not using narcotics from our office December 2024    History lumbar fusion L4/5 posterior hardware implants    Follow-up after bilateral sacroiliac injection # 2, January 14, 2025  She states she had 100% relief after procedure   Procedure did help improve her level of function    Procedure notes/fluoro images reviewed    Requesting different medications for arthritic pain she states Celebrex did not help     Mobic 15 mg 1 p.o. q.day    Continue home exercise program as directed    Follow-up as needed, patient request    Dr. Ruth January 2026      Bring original prescription medication bottles/container/box with labels to each visit

## 2025-02-05 ENCOUNTER — OFFICE VISIT (OUTPATIENT)
Dept: PAIN MEDICINE | Facility: CLINIC | Age: 75
End: 2025-02-05
Payer: MEDICARE

## 2025-02-05 VITALS — DIASTOLIC BLOOD PRESSURE: 74 MMHG | SYSTOLIC BLOOD PRESSURE: 141 MMHG | HEART RATE: 79 BPM

## 2025-02-05 DIAGNOSIS — M54.16 LUMBAR RADICULOPATHY: Chronic | ICD-10-CM

## 2025-02-05 DIAGNOSIS — M46.1 SACROILIITIS: Primary | Chronic | ICD-10-CM

## 2025-02-05 PROCEDURE — 99214 OFFICE O/P EST MOD 30 MIN: CPT | Mod: S$PBB,,, | Performed by: PHYSICIAN ASSISTANT

## 2025-02-05 PROCEDURE — 1159F MED LIST DOCD IN RCRD: CPT | Mod: ,,, | Performed by: PHYSICIAN ASSISTANT

## 2025-02-05 PROCEDURE — 3077F SYST BP >= 140 MM HG: CPT | Mod: ,,, | Performed by: PHYSICIAN ASSISTANT

## 2025-02-05 PROCEDURE — 1101F PT FALLS ASSESS-DOCD LE1/YR: CPT | Mod: ,,, | Performed by: PHYSICIAN ASSISTANT

## 2025-02-05 PROCEDURE — 99213 OFFICE O/P EST LOW 20 MIN: CPT | Mod: PBBFAC | Performed by: PHYSICIAN ASSISTANT

## 2025-02-05 PROCEDURE — 1126F AMNT PAIN NOTED NONE PRSNT: CPT | Mod: ,,, | Performed by: PHYSICIAN ASSISTANT

## 2025-02-05 PROCEDURE — 99999 PR PBB SHADOW E&M-EST. PATIENT-LVL III: CPT | Mod: PBBFAC,,, | Performed by: PHYSICIAN ASSISTANT

## 2025-02-05 PROCEDURE — 3078F DIAST BP <80 MM HG: CPT | Mod: ,,, | Performed by: PHYSICIAN ASSISTANT

## 2025-02-05 PROCEDURE — 3288F FALL RISK ASSESSMENT DOCD: CPT | Mod: ,,, | Performed by: PHYSICIAN ASSISTANT

## 2025-02-05 RX ORDER — MELOXICAM 15 MG/1
15 TABLET ORAL DAILY
Qty: 30 TABLET | Refills: 0 | Status: SHIPPED | OUTPATIENT
Start: 2025-02-05 | End: 2025-03-07

## 2025-07-09 ENCOUNTER — HOSPITAL ENCOUNTER (OUTPATIENT)
Dept: RADIOLOGY | Facility: HOSPITAL | Age: 75
Discharge: HOME | End: 2025-07-09
Attending: NURSE PRACTITIONER
Payer: MEDICARE

## 2025-07-09 VITALS — WEIGHT: 189 LBS | HEIGHT: 66 IN | BODY MASS INDEX: 30.37 KG/M2

## 2025-07-09 DIAGNOSIS — Z12.31 VISIT FOR SCREENING MAMMOGRAM: ICD-10-CM

## 2025-07-09 PROCEDURE — 77063 BREAST TOMOSYNTHESIS BI: CPT | Mod: TC

## 2025-07-09 PROCEDURE — 77067 SCR MAMMO BI INCL CAD: CPT | Mod: 26,,, | Performed by: RADIOLOGY

## 2025-07-09 PROCEDURE — 77063 BREAST TOMOSYNTHESIS BI: CPT | Mod: 26,,, | Performed by: RADIOLOGY

## 2025-07-25 DIAGNOSIS — R94.31 ABNORMAL EKG: Primary | ICD-10-CM

## 2025-07-31 ENCOUNTER — OFFICE VISIT (OUTPATIENT)
Dept: CARDIOLOGY | Facility: CLINIC | Age: 75
End: 2025-07-31
Payer: MEDICARE

## 2025-07-31 VITALS
HEIGHT: 60 IN | HEART RATE: 70 BPM | BODY MASS INDEX: 35.3 KG/M2 | DIASTOLIC BLOOD PRESSURE: 72 MMHG | SYSTOLIC BLOOD PRESSURE: 128 MMHG | OXYGEN SATURATION: 95 % | WEIGHT: 179.81 LBS

## 2025-07-31 DIAGNOSIS — I48.91 NEW ONSET ATRIAL FIBRILLATION: Primary | ICD-10-CM

## 2025-07-31 DIAGNOSIS — I10 HYPERTENSION, ESSENTIAL: Chronic | ICD-10-CM

## 2025-07-31 DIAGNOSIS — E78.5 HYPERLIPIDEMIA, UNSPECIFIED HYPERLIPIDEMIA TYPE: Chronic | ICD-10-CM

## 2025-07-31 PROCEDURE — 99204 OFFICE O/P NEW MOD 45 MIN: CPT | Mod: S$PBB,,, | Performed by: INTERNAL MEDICINE

## 2025-07-31 PROCEDURE — 3288F FALL RISK ASSESSMENT DOCD: CPT | Mod: ,,, | Performed by: INTERNAL MEDICINE

## 2025-07-31 PROCEDURE — 3078F DIAST BP <80 MM HG: CPT | Mod: ,,, | Performed by: INTERNAL MEDICINE

## 2025-07-31 PROCEDURE — 3074F SYST BP LT 130 MM HG: CPT | Mod: ,,, | Performed by: INTERNAL MEDICINE

## 2025-07-31 PROCEDURE — 93005 ELECTROCARDIOGRAM TRACING: CPT | Mod: PBBFAC | Performed by: INTERNAL MEDICINE

## 2025-07-31 PROCEDURE — 1101F PT FALLS ASSESS-DOCD LE1/YR: CPT | Mod: ,,, | Performed by: INTERNAL MEDICINE

## 2025-07-31 PROCEDURE — 99214 OFFICE O/P EST MOD 30 MIN: CPT | Mod: PBBFAC,25 | Performed by: INTERNAL MEDICINE

## 2025-07-31 PROCEDURE — 93010 ELECTROCARDIOGRAM REPORT: CPT | Mod: S$PBB,,, | Performed by: INTERNAL MEDICINE

## 2025-07-31 PROCEDURE — 1160F RVW MEDS BY RX/DR IN RCRD: CPT | Mod: ,,, | Performed by: INTERNAL MEDICINE

## 2025-07-31 PROCEDURE — 99999 PR PBB SHADOW E&M-EST. PATIENT-LVL IV: CPT | Mod: PBBFAC,,, | Performed by: INTERNAL MEDICINE

## 2025-07-31 PROCEDURE — 1159F MED LIST DOCD IN RCRD: CPT | Mod: ,,, | Performed by: INTERNAL MEDICINE

## 2025-07-31 RX ORDER — IBUPROFEN 800 MG/1
800 TABLET, FILM COATED ORAL EVERY 6 HOURS PRN
COMMUNITY
Start: 2025-07-22

## 2025-07-31 RX ORDER — ATORVASTATIN CALCIUM 20 MG/1
20 TABLET, FILM COATED ORAL DAILY
COMMUNITY
Start: 2025-02-07

## 2025-07-31 RX ORDER — TRAMADOL HYDROCHLORIDE 50 MG/1
50 TABLET, FILM COATED ORAL 2 TIMES DAILY
COMMUNITY
Start: 2025-07-22

## 2025-07-31 RX ORDER — CARVEDILOL 6.25 MG/1
6.25 TABLET ORAL 2 TIMES DAILY WITH MEALS
Qty: 180 TABLET | Refills: 3 | Status: SHIPPED | OUTPATIENT
Start: 2025-07-31 | End: 2026-07-31

## 2025-07-31 RX ORDER — CARVEDILOL 3.12 MG/1
6.25 TABLET ORAL 2 TIMES DAILY WITH MEALS
COMMUNITY
Start: 2025-07-22 | End: 2025-07-31

## 2025-08-01 LAB
OHS QRS DURATION: 128 MS
OHS QTC CALCULATION: 463 MS

## 2025-08-01 NOTE — PROGRESS NOTES
PCP: Roseanna Kimball NP    Referring Provider:     Subjective:   Regina Murray is a 75 y.o. female with hx of HTN and HLD who presents for evaluation for abnormal EKG.  Referred by MAYCO Kimball NP.     Fhx:No family history on file.  Shx: Social History[1]    EKG   7/31/25--sinus rhythm with PAC's, RBBB, 65 bpm  7/22/25--(from [facility])-afib with RVR, RBBB    Current Medications[2]    Review of Systems   Respiratory:  Negative for shortness of breath.    Cardiovascular:  Negative for chest pain, palpitations and leg swelling.   Neurological:  Negative for loss of consciousness.           Objective:   /72 (BP Location: Right arm, Patient Position: Sitting)   Pulse 70   Ht 5' (1.524 m)   Wt 81.6 kg (179 lb 12.8 oz)   SpO2 95%   BMI 35.11 kg/m²     Physical Exam  Vitals reviewed.   Constitutional:       General: She is not in acute distress.     Appearance: Normal appearance.   HENT:      Head: Normocephalic and atraumatic.   Neck:      Vascular: No carotid bruit or JVD.   Cardiovascular:      Rate and Rhythm: Normal rate and regular rhythm.      Pulses: Normal pulses.           Radial pulses are 2+ on the right side and 2+ on the left side.      Heart sounds: Normal heart sounds. No murmur heard.  Pulmonary:      Effort: Pulmonary effort is normal.      Breath sounds: Normal breath sounds.   Musculoskeletal:      Right lower leg: No edema.      Left lower leg: No edema.   Skin:     General: Skin is warm and dry.   Neurological:      Mental Status: She is alert.           Assessment:     1. New onset atrial fibrillation  Ambulatory referral/consult to Cardiology    EKG 12-lead    EKG 12-lead    Lipid Panel    TSH    Echo    X-Ray Chest PA And Lateral      2. Hypertension, essential        3. Hyperlipidemia, unspecified hyperlipidemia type              Plan:   Eliquis 5 mg one po bid  Increase Coreg to 6.25 mg one po bid  Echo  Cxr- pa/ lat  TSH, lipid  F/u in 6-8 weeks.               [1]   Social History  Socioeconomic History    Marital status:    Tobacco Use    Smoking status: Never    Smokeless tobacco: Never   Substance and Sexual Activity    Alcohol use: Not Currently   [2]   Current Outpatient Medications:     atorvastatin (LIPITOR) 20 MG tablet, Take 20 mg by mouth once daily., Disp: , Rfl:     hydroCHLOROthiazide (HYDRODIURIL) 25 MG tablet, Take 25 mg by mouth once daily., Disp: , Rfl:     ibuprofen (ADVIL,MOTRIN) 800 MG tablet, Take 800 mg by mouth every 6 (six) hours as needed., Disp: , Rfl:     traMADoL (ULTRAM) 50 mg tablet, Take 50 mg by mouth 2 (two) times daily., Disp: , Rfl:     amLODIPine (NORVASC) 10 MG tablet, Take 10 mg by mouth once daily. (Patient not taking: Reported on 7/31/2025), Disp: , Rfl:     apixaban (ELIQUIS) 5 mg Tab, Take 1 tablet (5 mg total) by mouth 2 (two) times daily., Disp: 60 tablet, Rfl: 6    carvediloL (COREG) 6.25 MG tablet, Take 1 tablet (6.25 mg total) by mouth 2 (two) times daily with meals., Disp: 180 tablet, Rfl: 3    ferrous sulfate (FEOSOL) Tab tablet, Take 1 tablet by mouth once daily. (Patient not taking: Reported on 7/31/2025), Disp: , Rfl:

## 2025-08-05 PROBLEM — I48.91 NEW ONSET ATRIAL FIBRILLATION: Status: ACTIVE | Noted: 2025-08-05

## 2025-08-05 PROBLEM — E78.5 HYPERLIPIDEMIA: Chronic | Status: ACTIVE | Noted: 2025-08-05

## 2025-08-05 PROBLEM — I10 HYPERTENSION, ESSENTIAL: Chronic | Status: ACTIVE | Noted: 2025-08-05

## 2025-08-15 ENCOUNTER — HOSPITAL ENCOUNTER (OUTPATIENT)
Dept: RADIOLOGY | Facility: HOSPITAL | Age: 75
Discharge: HOME | End: 2025-08-15
Attending: INTERNAL MEDICINE
Payer: MEDICARE

## 2025-08-15 ENCOUNTER — HOSPITAL ENCOUNTER (OUTPATIENT)
Dept: CARDIOLOGY | Facility: HOSPITAL | Age: 75
Discharge: HOME | End: 2025-08-15
Attending: INTERNAL MEDICINE
Payer: MEDICARE

## 2025-08-15 DIAGNOSIS — I48.91 NEW ONSET ATRIAL FIBRILLATION: ICD-10-CM

## 2025-08-15 LAB
AORTIC ROOT ANNULUS: 2.5 CM
AORTIC VALVE CUSP SEPERATION: 1.59 CM
ASCENDING AORTA: 2.8 CM
AV INDEX (PROSTH): 0.6
AV MEAN GRADIENT: 6 MMHG
AV PEAK GRADIENT: 13 MMHG
AV REGURGITATION PRESSURE HALF TIME: 470 MS
AV VALVE AREA BY VELOCITY RATIO: 1.9 CM²
AV VALVE AREA: 1.7 CM²
AV VELOCITY RATIO: 0.67
CV ECHO LV RWT: 0.5 CM
DOP CALC AO PEAK VEL: 1.8 M/S
DOP CALC AO VTI: 46.3 CM
DOP CALC LVOT AREA: 2.8 CM2
DOP CALC LVOT DIAMETER: 1.9 CM
DOP CALC LVOT PEAK VEL: 1.2 M/S
DOP CALC MV VTI: 63 CM
DOP CALCLVOT PEAK VEL VTI: 27.8 CM
E WAVE DECELERATION TIME: 197 MSEC
E/A RATIO: 1.09
E/E' RATIO: 14 M/S
ECHO LV POSTERIOR WALL: 0.9 CM (ref 0.6–1.1)
EJECTION FRACTION: 65 %
FRACTIONAL SHORTENING: 38.9 % (ref 28–44)
INTERVENTRICULAR SEPTUM: 0.9 CM (ref 0.6–1.1)
IVC DIAMETER: 1.46 CM
LEFT ATRIUM AREA SYSTOLIC (APICAL 2 CHAMBER): 27.01 CM2
LEFT ATRIUM AREA SYSTOLIC (APICAL 4 CHAMBER): 17.46 CM2
LEFT ATRIUM SIZE: 4.2 CM
LEFT ATRIUM VOLUME MOD: 73 ML
LEFT INTERNAL DIMENSION IN SYSTOLE: 2.2 CM (ref 2.1–4)
LEFT VENTRICLE DIASTOLIC VOLUME: 56 ML
LEFT VENTRICLE END SYSTOLIC VOLUME APICAL 2 CHAMBER: 82.5 ML
LEFT VENTRICLE END SYSTOLIC VOLUME APICAL 4 CHAMBER: 47.03 ML
LEFT VENTRICLE SYSTOLIC VOLUME: 16 ML
LEFT VENTRICULAR INTERNAL DIMENSION IN DIASTOLE: 3.6 CM (ref 3.5–6)
LEFT VENTRICULAR MASS: 92.8 G
LV LATERAL E/E' RATIO: 12.7 M/S
LV SEPTAL E/E' RATIO: 16.3 M/S
LVED V (TEICH): 55.51 ML
LVES V (TEICH): 16.43 ML
LVOT MG: 2.48 MMHG
LVOT MV: 0.72 CM/S
MV MEAN GRADIENT: 3 MMHG
MV PEAK A VEL: 1.05 M/S
MV PEAK E VEL: 1.14 M/S
MV PEAK GRADIENT: 8 MMHG
MV STENOSIS PRESSURE HALF TIME: 57.24 MS
MV VALVE AREA BY CONTINUITY EQUATION: 1.25 CM2
MV VALVE AREA P 1/2 METHOD: 3.84 CM2
OHS CV RV/LV RATIO: 0.83 CM
PISA AR MAX VEL: 5.04 M/S
PISA TR MAX VEL: 3.2 M/S
PV MV: 0.77 M/S
PV PEAK GRADIENT: 7 MMHG
PV PEAK VELOCITY: 1.28 M/S
RA VOL SYS: 31.04 ML
RIGHT ATRIAL AREA: 13.5 CM2
RIGHT ATRIUM VOLUME AREA LENGTH APICAL 4 CHAMBER: 29.4 ML
RIGHT VENTRICLE DIASTOLIC BASEL DIMENSION: 3 CM
RIGHT VENTRICLE DIASTOLIC LENGTH: 6.7 CM
RIGHT VENTRICLE DIASTOLIC MID DIMENSION: 2 CM
RIGHT VENTRICULAR END-DIASTOLIC DIMENSION: 3.02 CM
RIGHT VENTRICULAR LENGTH IN DIASTOLE (APICAL 4-CHAMBER VIEW): 6.65 CM
RV MID DIAMA: 1.96 CM
STJ: 2.2 CM
TDI LATERAL: 0.09 M/S
TDI SEPTAL: 0.07 M/S
TDI: 0.08 M/S
TR MAX PG: 40 MMHG
TRICUSPID ANNULAR PLANE SYSTOLIC EXCURSION: 2.7 CM

## 2025-08-15 PROCEDURE — 93306 TTE W/DOPPLER COMPLETE: CPT | Mod: 26,,, | Performed by: INTERNAL MEDICINE

## 2025-08-15 PROCEDURE — 93306 TTE W/DOPPLER COMPLETE: CPT

## 2025-08-15 PROCEDURE — 71046 X-RAY EXAM CHEST 2 VIEWS: CPT | Mod: TC

## 2025-08-15 PROCEDURE — 71046 X-RAY EXAM CHEST 2 VIEWS: CPT | Mod: 26,,, | Performed by: RADIOLOGY

## 2025-09-02 ENCOUNTER — OFFICE VISIT (OUTPATIENT)
Dept: ORTHOPEDICS | Facility: CLINIC | Age: 75
End: 2025-09-02
Payer: MEDICARE

## 2025-09-02 ENCOUNTER — HOSPITAL ENCOUNTER (EMERGENCY)
Facility: HOSPITAL | Age: 75
Discharge: HOME | End: 2025-09-02
Payer: MEDICARE

## 2025-09-02 VITALS
SYSTOLIC BLOOD PRESSURE: 240 MMHG | OXYGEN SATURATION: 92 % | HEART RATE: 66 BPM | HEIGHT: 60 IN | DIASTOLIC BLOOD PRESSURE: 120 MMHG | BODY MASS INDEX: 35.14 KG/M2 | WEIGHT: 179 LBS

## 2025-09-02 VITALS
HEIGHT: 66 IN | TEMPERATURE: 98 F | SYSTOLIC BLOOD PRESSURE: 183 MMHG | BODY MASS INDEX: 28.77 KG/M2 | DIASTOLIC BLOOD PRESSURE: 62 MMHG | HEART RATE: 61 BPM | OXYGEN SATURATION: 100 % | RESPIRATION RATE: 18 BRPM | WEIGHT: 179 LBS

## 2025-09-02 DIAGNOSIS — M25.552 LEFT HIP PAIN: Primary | ICD-10-CM

## 2025-09-02 DIAGNOSIS — M25.559 HIP PAIN, UNSPECIFIED LATERALITY: ICD-10-CM

## 2025-09-02 DIAGNOSIS — I10 HYPERTENSION, UNSPECIFIED TYPE: Primary | ICD-10-CM

## 2025-09-02 PROCEDURE — 25000003 PHARM REV CODE 250: Performed by: NURSE PRACTITIONER

## 2025-09-02 PROCEDURE — 99999 PR PBB SHADOW E&M-EST. PATIENT-LVL IV: CPT | Mod: PBBFAC,,,

## 2025-09-02 PROCEDURE — 99283 EMERGENCY DEPT VISIT LOW MDM: CPT

## 2025-09-02 PROCEDURE — 99214 OFFICE O/P EST MOD 30 MIN: CPT | Mod: PBBFAC

## 2025-09-02 PROCEDURE — 99499 UNLISTED E&M SERVICE: CPT | Mod: S$PBB,,,

## 2025-09-02 RX ORDER — HYDROCODONE BITARTRATE AND ACETAMINOPHEN 5; 325 MG/1; MG/1
1 TABLET ORAL
Refills: 0 | Status: COMPLETED | OUTPATIENT
Start: 2025-09-02 | End: 2025-09-02

## 2025-09-02 RX ADMIN — HYDROCODONE BITARTRATE AND ACETAMINOPHEN 1 TABLET: 5; 325 TABLET ORAL at 12:09

## 2025-09-03 ENCOUNTER — TELEPHONE (OUTPATIENT)
Dept: EMERGENCY MEDICINE | Facility: HOSPITAL | Age: 75
End: 2025-09-03
Payer: MEDICARE

## 2025-09-04 ENCOUNTER — HOSPITAL ENCOUNTER (OUTPATIENT)
Dept: RADIOLOGY | Facility: HOSPITAL | Age: 75
Discharge: HOME | End: 2025-09-04
Payer: MEDICARE

## 2025-09-04 DIAGNOSIS — M25.552 PAIN OF LEFT HIP: ICD-10-CM

## 2025-09-04 PROCEDURE — 73502 X-RAY EXAM HIP UNI 2-3 VIEWS: CPT | Mod: TC,LT

## 2025-09-04 PROCEDURE — 73502 X-RAY EXAM HIP UNI 2-3 VIEWS: CPT | Mod: 26,LT,, | Performed by: RADIOLOGY

## (undated) DEVICE — APPLICATOR CHLORAPREP ORN 26ML

## (undated) DEVICE — TRAY NERVE BLOCK UNIV 10/CA

## (undated) DEVICE — CATH INTROCAN SAF 2 IV 22GX1IN

## (undated) DEVICE — SET EXT STD BORE CATH 7.6IN

## (undated) DEVICE — GLOVE SENSICARE PI SURG 6.5

## (undated) DEVICE — NDL TUOHY 22G X 3.5

## (undated) DEVICE — KIT IV START

## (undated) DEVICE — SLIPPER FALL PREV YEL BARIAT

## (undated) DEVICE — CATH IV INTROCAN 24G X 3/4